# Patient Record
Sex: FEMALE | Race: WHITE | NOT HISPANIC OR LATINO | Employment: FULL TIME | URBAN - METROPOLITAN AREA
[De-identification: names, ages, dates, MRNs, and addresses within clinical notes are randomized per-mention and may not be internally consistent; named-entity substitution may affect disease eponyms.]

---

## 2020-02-08 ENCOUNTER — HOSPITAL ENCOUNTER (EMERGENCY)
Facility: CLINIC | Age: 35
Discharge: HOME | End: 2020-02-09
Attending: EMERGENCY MEDICINE | Admitting: EMERGENCY MEDICINE
Payer: COMMERCIAL

## 2020-02-08 DIAGNOSIS — J11.1 INFLUENZA: ICD-10-CM

## 2020-02-08 PROCEDURE — 25000132 ZZH RX MED GY IP 250 OP 250 PS 637: Performed by: EMERGENCY MEDICINE

## 2020-02-08 PROCEDURE — 99283 EMERGENCY DEPT VISIT LOW MDM: CPT

## 2020-02-08 PROCEDURE — 25000128 H RX IP 250 OP 636: Performed by: EMERGENCY MEDICINE

## 2020-02-08 RX ORDER — IBUPROFEN 600 MG/1
600 TABLET, FILM COATED ORAL ONCE
Status: COMPLETED | OUTPATIENT
Start: 2020-02-08 | End: 2020-02-08

## 2020-02-08 RX ORDER — ONDANSETRON 4 MG/1
4 TABLET, ORALLY DISINTEGRATING ORAL ONCE
Status: COMPLETED | OUTPATIENT
Start: 2020-02-08 | End: 2020-02-08

## 2020-02-08 RX ORDER — ACETAMINOPHEN 500 MG
500 TABLET ORAL ONCE
Status: COMPLETED | OUTPATIENT
Start: 2020-02-08 | End: 2020-02-08

## 2020-02-08 RX ADMIN — ACETAMINOPHEN 500 MG: 500 TABLET, FILM COATED ORAL at 23:55

## 2020-02-08 RX ADMIN — IBUPROFEN 600 MG: 600 TABLET, FILM COATED ORAL at 23:55

## 2020-02-08 RX ADMIN — ONDANSETRON 4 MG: 4 TABLET, ORALLY DISINTEGRATING ORAL at 23:55

## 2020-02-08 ASSESSMENT — ENCOUNTER SYMPTOMS
CHILLS: 1
HEADACHES: 1
MYALGIAS: 1
DYSURIA: 0
SHORTNESS OF BREATH: 1
DIARRHEA: 0
FEVER: 1
VOMITING: 1
COUGH: 1
NAUSEA: 1

## 2020-02-08 NOTE — ED AVS SNAPSHOT
[facility] Emergency Department  [address]  [facility] [address]  Phone:  [phone]  Fax:  [phone]                                    Rica Holguin   MRN: [id number]    Department:  [facility] Emergency Department   Date of Visit:  2/8/2020           After Visit Summary Signature Page    I have received my discharge instructions, and my questions have been answered. I have discussed any challenges I see with this plan with the nurse or doctor.    ..........................................................................................................................................  Patient/Patient Representative Signature      ..........................................................................................................................................  Patient Representative Print Name and Relationship to Patient    ..................................................               ................................................  Date                                   Time    ..........................................................................................................................................  Reviewed by Signature/Title    ...................................................              ..............................................  Date                                               Time          22EPIC Rev 08/18

## 2020-02-08 NOTE — LETTER
February 9, 2020      To Whom It May Concern:      Rica Holguin was seen in our Emergency Department today, 02/09/20.  Please excuse her from work through Tuesday 2/11/20.    Sincerely,        Maninder Adams MD

## 2020-02-09 VITALS
WEIGHT: 145 LBS | TEMPERATURE: 99.4 F | SYSTOLIC BLOOD PRESSURE: 118 MMHG | OXYGEN SATURATION: 95 % | DIASTOLIC BLOOD PRESSURE: 71 MMHG | RESPIRATION RATE: 20 BRPM

## 2020-02-09 RX ORDER — ONDANSETRON 4 MG/1
4 TABLET, ORALLY DISINTEGRATING ORAL EVERY 8 HOURS PRN
Qty: 10 TABLET | Refills: 0 | Status: SHIPPED | OUTPATIENT
Start: 2020-02-09 | End: 2020-02-12

## 2020-02-09 NOTE — ED PROVIDER NOTES
History     Chief Complaint:    Flu Symptoms    The history is provided by the patient.      Rica Holguin is a 34 year old female who presents with flu symptoms. Her symptoms began yesterday at work with chills. The patient states that she has been experiencing fevers, a dry cough, myalgias, nausea, vomiting, shortness of breath, and a headache since then. She denies diarrhea or dysuria. The patient has been taking cold and flu medicine containing acetaminophen. She had a fever of 102.3 around 2230 this evening and took 500 mg of Tylenol. The patient states that she has coworkers who are also sick. She has been able to keep water down.     Allergies:  No Known Drug Allergies     Medications:    The patient is currently on no regular medications.    Past Medical History:    The patient denies any significant past medical history.    Past Surgical History:    The patient does not have any pertinent past surgical history.    Family History:    No past pertinent family history.    Social History:  Tobacco use - unknown.  Alcohol use - unknown.  Drug use - unknown.  Patient accompanied to the ED by a family member.  Marital Status:  Unknown.     Review of Systems   Constitutional: Positive for chills and fever.   Respiratory: Positive for cough and shortness of breath.    Gastrointestinal: Positive for nausea and vomiting. Negative for diarrhea.   Genitourinary: Negative for dysuria.   Musculoskeletal: Positive for myalgias.   Neurological: Positive for headaches.   All other systems reviewed and are negative.        Physical Exam     Patient Vitals for the past 24 hrs:   BP Temp Temp src Heart Rate Resp SpO2 Weight   02/09/20 0047 -- 99.4  F (37.4  C) Oral -- -- -- --   02/09/20 0045 -- -- -- -- -- 95 % --   02/08/20 2327 118/71 99  F (37.2  C) Oral 133 20 99 % 65.8 kg (145 lb)     Physical Exam  Constitutional: Alert, attentive  HENT:     Nose: Nose normal.   Mouth/Throat: Oropharynx is clear, mucous membranes are  moist   Ears: Normal external ears. TMs clear bilaterally, normal external canals bilaterally.  Eyes: EOM are normal.    CV: tachcyardic, regular rhythm, no murmurs, rubs or gallups.  Chest: Effort normal and breath sounds normal.   GI: No distension. There is no tenderness.  MSK: Normal range of motion.   Neurological: Alert, attentive  Skin: Skin is warm and dry.      Emergency Department Course     Interventions:  2355: ibuprofen 600 mg PO  2355: Tylenol 500 mg PO  2355: Zofran-ODT 4 mg PO    Emergency Department Course:  Past medical records, nursing notes, and vitals reviewed.    2336: I performed an exam of the patient as documented above.     0057: I rechecked the patient and discussed the results of her workup thus far.     I personally reviewed the results with the Patient and family member and answered all related questions prior to discharge.     Findings and plan explained to the Patient and family member. Patient discharged home with instructions regarding supportive care, medications, and reasons to return. The importance of close follow-up was reviewed.     Vitals:    02/08/20 2327 02/09/20 0045 02/09/20 0047   BP: 118/71     Resp: 20     Temp: 99  F (37.2  C)  99.4  F (37.4  C)   TempSrc: Oral  Oral   SpO2: 99% 95%    Weight: 65.8 kg (145 lb)        on recheck      Impression & Plan     Medical Decision Making:  This is a well appearing 34 year old female who presents with history and exam consistent with acute febrile illness, with multiple exposures at work. There is no evidence of acute otitis media. There is no significant tachypnea, increased work of breathing, focal exam findings, or persistent symptoms to suggest pneumonia; I do not believe imaging is indicated at this time. The patient is febrile in the department, improved with supportive cares. Educated to antipyretic use. The patient is well-hydrated, well-appearing, and I believe is safe for discharge with plan for supportive cares.  I discussed precautions and how influenza is spread. The patient may take Tylenol or ibuprofen for pain and fever. Return if increasing pain, fever, difficulty breathing, vomiting, or any other concerns. Follow-up with primary physician in 3-5 days.      Diagnosis:    ICD-10-CM   1. Influenza J11.1     Disposition:  Discharged to home.    Discharge Medications:  New Prescriptions    ONDANSETRON (ZOFRAN ODT) 4 MG ODT TAB    Take 1 tablet (4 mg) by mouth every 8 hours as needed for nausea     Scribe Disclosure:  I, Terra Martinez, am serving as a scribe at 11:31 PM on 2/8/2020 to document services personally performed by Maninder Adams MD based on my observations and the provider's statements to me.     Terra Martinez  2/8/2020   [facility] EMERGENCY DEPARTMENT       Maninder Adams MD  02/09/20 0219

## 2020-02-09 NOTE — ED TRIAGE NOTES
Pt states fever, chills, nausea, vomiting, dyspnea, headache for one day. Last  Med 500mg tylenol at 2230. ABCs intact GCS 15

## 2020-03-11 ENCOUNTER — HEALTH MAINTENANCE LETTER (OUTPATIENT)
Age: 35
End: 2020-03-11

## 2021-01-04 ENCOUNTER — HEALTH MAINTENANCE LETTER (OUTPATIENT)
Age: 36
End: 2021-01-04

## 2021-02-25 LAB
HPV ABSTRACT: NORMAL
PAP SMEAR - HIM PATIENT REPORTED: NEGATIVE

## 2021-03-15 ENCOUNTER — OFFICE VISIT (OUTPATIENT)
Dept: FAMILY MEDICINE | Facility: CLINIC | Age: 36
End: 2021-03-15
Payer: COMMERCIAL

## 2021-03-15 VITALS
SYSTOLIC BLOOD PRESSURE: 110 MMHG | HEIGHT: 66 IN | TEMPERATURE: 96.8 F | WEIGHT: 115 LBS | DIASTOLIC BLOOD PRESSURE: 86 MMHG | HEART RATE: 99 BPM | BODY MASS INDEX: 18.48 KG/M2 | RESPIRATION RATE: 16 BRPM | OXYGEN SATURATION: 99 %

## 2021-03-15 DIAGNOSIS — Z31.69 ENCOUNTER FOR PRECONCEPTION CONSULTATION: ICD-10-CM

## 2021-03-15 DIAGNOSIS — R22.1 LUMP IN NECK: Primary | ICD-10-CM

## 2021-03-15 DIAGNOSIS — E04.9 ENLARGED THYROID: ICD-10-CM

## 2021-03-15 LAB
BASOPHILS NFR BLD AUTO: 0.5 %
DIFFERENTIAL METHOD BLD: ABNORMAL
EOSINOPHIL NFR BLD AUTO: 1.4 %
ERYTHROCYTE [DISTWIDTH] IN BLOOD BY AUTOMATED COUNT: 12.4 % (ref 10–15)
HCT VFR BLD AUTO: 43.7 % (ref 35–47)
HGB BLD-MCNC: 14.3 G/DL (ref 11.7–15.7)
LYMPHOCYTES NFR BLD AUTO: 28.3 %
MCH RBC QN AUTO: 29.4 PG (ref 26.5–33)
MCHC RBC AUTO-ENTMCNC: 32.7 G/DL (ref 31.5–36.5)
MCV RBC AUTO: 90 FL (ref 78–100)
MONOCYTES NFR BLD AUTO: 7.3 %
NEUTROPHILS NFR BLD AUTO: 62.5 %
PLATELET # BLD AUTO: 454 10E9/L (ref 150–450)
RBC # BLD AUTO: 4.87 10E12/L (ref 3.8–5.2)
T3 SERPL-MCNC: 99 NG/DL (ref 60–181)
WBC # BLD AUTO: 7.3 10E9/L (ref 4–11)

## 2021-03-15 PROCEDURE — 85025 COMPLETE CBC W/AUTO DIFF WBC: CPT | Performed by: FAMILY MEDICINE

## 2021-03-15 PROCEDURE — 84480 ASSAY TRIIODOTHYRONINE (T3): CPT | Performed by: FAMILY MEDICINE

## 2021-03-15 PROCEDURE — 84443 ASSAY THYROID STIM HORMONE: CPT | Performed by: FAMILY MEDICINE

## 2021-03-15 PROCEDURE — 99203 OFFICE O/P NEW LOW 30 MIN: CPT | Performed by: FAMILY MEDICINE

## 2021-03-15 PROCEDURE — 36415 COLL VENOUS BLD VENIPUNCTURE: CPT | Performed by: FAMILY MEDICINE

## 2021-03-15 PROCEDURE — 86800 THYROGLOBULIN ANTIBODY: CPT | Performed by: FAMILY MEDICINE

## 2021-03-15 PROCEDURE — 86376 MICROSOMAL ANTIBODY EACH: CPT | Performed by: FAMILY MEDICINE

## 2021-03-15 PROCEDURE — 84439 ASSAY OF FREE THYROXINE: CPT | Performed by: FAMILY MEDICINE

## 2021-03-15 RX ORDER — VITAMIN A, VITAMIN C, VITAMIN D-3, VITAMIN E, VITAMIN B-1, VITAMIN B-2, NIACIN, VITAMIN B-6, CALCIUM, IRON, ZINC, COPPER 4000; 120; 400; 22; 1.84; 3; 20; 10; 1; 12; 200; 27; 25; 2 [IU]/1; MG/1; [IU]/1; MG/1; MG/1; MG/1; MG/1; MG/1; MG/1; UG/1; MG/1; MG/1; MG/1; MG/1
1 TABLET ORAL DAILY
COMMUNITY
Start: 2021-03-15

## 2021-03-15 SDOH — HEALTH STABILITY: MENTAL HEALTH: HOW OFTEN DO YOU HAVE A DRINK CONTAINING ALCOHOL?: NEVER

## 2021-03-15 SDOH — ECONOMIC STABILITY: FOOD INSECURITY: WITHIN THE PAST 12 MONTHS, THE FOOD YOU BOUGHT JUST DIDN'T LAST AND YOU DIDN'T HAVE MONEY TO GET MORE.: NOT ASKED

## 2021-03-15 SDOH — ECONOMIC STABILITY: TRANSPORTATION INSECURITY
IN THE PAST 12 MONTHS, HAS THE LACK OF TRANSPORTATION KEPT YOU FROM MEDICAL APPOINTMENTS OR FROM GETTING MEDICATIONS?: NOT ASKED

## 2021-03-15 SDOH — ECONOMIC STABILITY: FOOD INSECURITY: WITHIN THE PAST 12 MONTHS, YOU WORRIED THAT YOUR FOOD WOULD RUN OUT BEFORE YOU GOT MONEY TO BUY MORE.: NOT ASKED

## 2021-03-15 SDOH — ECONOMIC STABILITY: INCOME INSECURITY: HOW HARD IS IT FOR YOU TO PAY FOR THE VERY BASICS LIKE FOOD, HOUSING, MEDICAL CARE, AND HEATING?: NOT ASKED

## 2021-03-15 SDOH — ECONOMIC STABILITY: TRANSPORTATION INSECURITY
IN THE PAST 12 MONTHS, HAS LACK OF TRANSPORTATION KEPT YOU FROM MEETINGS, WORK, OR FROM GETTING THINGS NEEDED FOR DAILY LIVING?: NOT ASKED

## 2021-03-15 ASSESSMENT — MIFFLIN-ST. JEOR: SCORE: 1233.39

## 2021-03-15 NOTE — PATIENT INSTRUCTIONS
[facility]  [address] [place] [id number]  Office: [phone]   Fax:    [phone]         Patient Education     Common Thyroid Problems  The thyroid is a gland in the neck. It's controlled by the pituitary gland in the brain. The thyroid makes 2 hormones that control how the body uses and stores energy. If there's a problem with the thyroid, the level of thyroid hormones may change. This can lead to symptoms. Thyroid problems can be treated.    Hypothyroidism  This is when the thyroid gland doesn t make enough hormone. The most common cause of hypothyroidism is Hashimoto thyroiditis. This occurs when the body s immune system mistakenly attacks the thyroid gland. This damages the thyroid so it doesn't make enough hormones. Hypothyroidism may also occur if there s a severe deficiency or an excess of iodine in the body. The thyroid needs iodine to make hormone. Problems with the pituitary gland can lead to not enough production of thyroid hormone. Some medicines can cause hypothyroidism. Hypothyroidism will also occur if the thyroid gland is removed during surgery. Hypothyroidism, can also develop after pregnancy.  Common symptoms include:    Low energy and tiredness    Depression    Feeling cold    Muscle pain    Slowed thinking        Constipation    Heavier menstrual periods with prolonged bleeding     Weight gain    Dry and brittle skin, hair, nails    Excessive sleepiness  Hyperthyroidism  This is when the thyroid gland makes too much hormone. The most common cause is Graves disease. It occurs when the body s immune system mistakenly tells the thyroid to make too much hormone. Another cause is a small bump (nodule) in the thyroid gland. This can cause hyperthyroidism if the cells in the nodule make too much thyroid hormone and stop hormone production in the rest of the thyroid gland.  Common symptoms include:    Shaking, nervousness, irritability    Feeling hot    A rapid  or irregular heartbeat    Muscle weakness, fatigue    Tremors in the hands    More frequent bowel movements    Pixley, lighter. or irregular menstrual periods    Weight loss    Hair loss    Bulging eyes  Nodules  Nodules are lumps of tissue in the thyroid gland. Most often, the cause of nodules isn t known. But they may be more common in people who ve had treatment with radiation to the head or neck. Sometimes nodules can be felt on the outside of the neck. Most of the time, the nodules cause no symptoms and don t affect the amount of thyroid hormone. Most nodules are not cancer. But sometimes a nodule may be cancer. Risk factors for cancer include age, gender, family history of thyroid cancer, and history of radiation exposure.  What is a goiter?  A goiter is the enlargement of the thyroid gland. When the gland enlarges, you may see or feel a swelling on your neck. A goiter can develop in someone with a normal thyroid, overactive thyroid, or underactive thyroid.  [employer] last reviewed this educational content on 3/1/2020    3528-0469 [employer]. All rights reserved. This information is not intended as a substitute for professional medical care. Always follow your healthcare professional's instructions.           Patient Education     Treating Thyroid Problems  Your healthcare provider has diagnosed a thyroid problem and will work with you to create a treatment plan. Even if you don t have symptoms, getting the correct care is important. These are the most common types of thyroid disorders and their treatments.     Treating hypothyroidism  Hypothyroidism is an underactive thyroid. There is no cure for this condition. But treatment can relieve most or all of your symptoms caused by the low thyroid hormone levels. Treatment is done with daily thyroid hormone pills. Thyroid hormone pills replace the hormone your thyroid doesn t make. You will likely need to take a daily pill for the rest of your life.    Your healthcare provider will adjust your dose to achieve the right hormone levels. Take the thyroid hormone pill on an empty stomach, without other medicines. This is to make sure it works as it should.   Over time, your dose may be adjusted. The medicine has minimal side effects if the dosage is correct. But if the dosage is too high, you may have symptoms of an overactive thyroid. These include:     Nervousness    Irritability    Fast heartbeat    Shaking (tremors)    Trouble sleeping    Brittle hair.  If the dosage is too low, you may have symptoms of an underactive thyroid. These include:     Low energy    Sleepiness    Memory problems  Tell your healthcare provider if you have any symptoms of thyroid problems. Also let your provider know if you are prescribed any new medicines, especially estrogens or testosterone. These will change the dose of thyroid hormone that is right for you. Your dose will also need to increase if you become pregnant.   Treating hyperthyroidism  Hyperthyroidism is an overactive thyroid. The treatments include:     Anti-thyroid medicine. This can reduce the amount of thyroid hormone made by your thyroid gland. Reactions from this medicine are rare. But in some cases it can cause a dangerous drop in white blood cells, and liver damage. Talk with your healthcare provider for more information. If you have a fever or sore throat while taking this medicine, tell your provider right away.    Radioactive iodine ablation. This is the most common treatment for hyperthyroidism. It s done by taking a pill or liquid dose of radioactive iodine. This treatment destroys the thyroid cells that are making too much hormone. You may need daily thyroid hormone pills after this treatment.    Surgery. This is done by removing part or all of your thyroid gland. After surgery, you may need to take daily thyroid hormone pills.    Beta-blockers. The treatments above may be used alone or with beta-blockers.  These are medicines that can reduce symptoms caused by too much thyroid hormone. In some case, symptoms from too much thyroid hormone can be controlled by beta-blockers alone.  Treating nodules  If you have noncancer (benign) nodules, you may not need treatment right away. Instead, your healthcare provider may advise regular exams and ultrasound tests to see if the nodules grow. If treatment is needed, it may include:     Anti-thyroid medicine. If a benign thyroid nodule is causing an overactive thyroid, your healthcare provider may first treat it with anti-thyroid medicine. If this doesn t work, you may need one of the below treatments.    Radioactive iodine ablation. This is the most common treatment for hyperthyroidism. It s done by taking a pill or liquid dose of radioactive iodine. This treatment destroys the thyroid cells that are making too much hormone. You may need daily thyroid hormone pills after this treatment.    Surgery. This may be done to treat nodules that are causing symptoms, such as choking or trouble swallowing. Surgery is done by removing part or all of your thyroid gland. Surgery to remove cancer nodules may be followed by radioiodine iodine ablation. After surgery, you may need to take daily thyroid hormone pills.  If you have radioactive iodine ablation  Even though it uses tiny amounts of radiation, radioactive iodine ablation is a safe treatment. Your healthcare provider will talk with you about any risks and possible complications. You will likely receive the iodine at the hospital and go home the same day. The risk from the radiation to yourself and others is very small. But you may need to stay away from other people for a few days. It's most important to stay away from children and pregnant women during this time.   [employer] last reviewed this educational content on 8/1/2019 2000-2020 [employer]. All rights reserved. This information is not intended as a substitute  for professional medical care. Always follow your healthcare professional's instructions.           Patient Education     Local Lymph Node Swelling in the Neck, No Antibiotic Treatment    You have a swollen lymph node in your neck that is not infected. The lymph nodes are part of the immune system. They are found under the jaw and along the side of the neck, in the armpits, and in the groin. A nearby infection or inflammation causes the lymph nodes in that area to swell. They may also be mildly tender. This is normal.   Antibiotics are not used for a swollen lymph node that is not infected. You can use warm compresses and pain medicine to treat this condition. The pain will get better over the next 7 to 10 days. The swelling may take 1 to 2 weeks or more to go away.   Rarely, a bacterial infection occurs inside the lymph node itself. When this happens, the lymph node becomes very painful and the nearby skin gets red and warm. You may also have a fever. If this happens, call your healthcare provider. You may need to take antibiotics. You may also need to have the lymph node drained.   Home care  Follow these guidelines when caring for yourself at home:    Make a warm compress by running warm water over a washcloth. Put the compress on the sore area until the compress cools off. Repeat this for 20 minutes. Use the compress 3 times a day for the first 3 days, or until the pain and redness start to get better. The heat will make more blood flow to the area and speed the healing process.    You may use acetaminophen or ibuprofen to control pain and fever, unless another medicine was prescribed for this. Don t use ibuprofen in children under 6 months of age. If you have chronic liver or kidney disease, talk with your healthcare provider before using these medicines. Also talk with your provider if you ve had a stomach ulcer or digestive tract bleeding. Don t give aspirin to anyone under 18 years of age who is ill with a  fever. It may cause severe liver damage.  Follow-up care  Follow up with your healthcare provider, or as advised.  When to seek medical advice  Call your healthcare provider right away if any of these occur:    Redness over the lymph node    Swelling or pain in the lymph node gets worse    Lymph node is getting soft in the middle    Pus or fluid drains from the lymph node    You have trouble breathing or swallowing    Fever of 100.4 F (38 C) or higher, or as directed by your healthcare provider    You have questions or concerns   [employer] last reviewed this educational content on 9/1/2019 2000-2020 [employer]. All rights reserved. This information is not intended as a substitute for professional medical care. Always follow your healthcare professional's instructions.         Thank you so much or choosing [facility]  for your Health Care. It was a pleasure seeing you at your visit today! Please contact us with any questions or concerns you may have.                   Luz Elise MD                              To reach your [facility] care team after hours call:   [phone]    PLEASE NOTE OUR HOURS HAVE CHANGED secondary to COVID-19 coronavirus pandemic, as we are trying to minimize patient exposure to the virus,  which is now widespread in the [facility].  These hours may change with very little notice.  We apologize for any inconvenience.       Our current clinic hours are:          Monday- Thursday   7:00am - 6:00pm  in person.      Friday  7:00am- 5:00pm                       Saturday and Sunday : Closed to in person and virtual visits        We have telephone and virtual visit times available between    7:00am - 6pm on Monday-Friday as well.                                                Phone:  [phone]      Our pharmacy hours: Monday through Friday 9:00am to 5:00pm                        Saturday - 9:00 am to 12 noon        Sunday : Closed.              Phone:  [phone]              ###  Please note: at this time we are not accepting any walk-in visits. ###      There is also information available at our web site:  [email or web address]    If your provider ordered any lab tests and you do not receive the results within 10 business days, please call the clinic.    If you need a medication refill please contact your pharmacy.  Please allow 2 business days for your refill to be completed.    Our clinic offers telephone visits and e visits.  Please ask one of your team members to explain more.      Use [employer] (secure email communication and access to your chart) to send your primary care provider a message or make an appointment. Ask someone on your Team how to sign up for "[employer]t.

## 2021-03-15 NOTE — PROGRESS NOTES
Assessment & Plan :     (R22.1) Lump in neck- left lateral just posterior to the Sternocleidomastoid muscle on the left -1cm x 5mm on palpation - soft , mobile   (primary encounter diagnosis) - feels very benign today.   Plan: CBC with platelets differential, US Head Neck         Soft Tissue            (E04.9) Enlarged thyroid  Comment: mild generalized enlargement without overt nodularity.   Plan: CBC with platelets differential, US Head Neck         Soft Tissue, T4 free, TSH, T3 total, Anti         thyroglobulin antibody, Thyroid peroxidase         antibody            Please, call our clinic or go to the ER immediately if signs or symptoms worsen or fail to improve as anticipated.       Review of prior external note(s) from - CareEverywhere information from [place] [facility] - Dr. Myrna Vazquez  reviewed    37 minutes spent on the date of the encounter doing chart review, history and exam, documentation and further activities as noted above       Regular exercise  See Patient Instructions    Return in about 1 year (around 3/15/2022).or sooner, if any signs or symptoms worsen.             Luz Elise MD  [facility] [place]    Jaime Strauss is a 35 year old who presents for the following health issues - new patient to me today.     Medical history and chart updated in detail today.     HPI       Concern - lump in neck:   Onset: x3 months- noticed first during a massage - ob/gyn could not feel it on exam on 2/25/2021.   Description: lump on left melba eof neck  Intensity: not painful  Progression of Symptoms:  same  Accompanying Signs & Symptoms: no other symptoms - no fatigue, No fevers, chills or sweats. , no ear pain or pressure, no bug bites in area. No sore throat , no URI symptoms.   Previous history of similar problem: no      Health Maintenance   Topic Date Due     PREVENTIVE CARE VISIT  Never done     ANNUAL REVIEW OF HM ORDERS  Never done     ADVANCE CARE PLANNING   Never done     HIV SCREENING  Never done     HEPATITIS C SCREENING  Never done     PAP  Never done     DTAP/TDAP/TD IMMUNIZATION (1 - Tdap) Never done     INFLUENZA VACCINE (1) 09/01/2020     PHQ-2  Completed     IPV IMMUNIZATION  Completed     Pneumococcal Vaccine: Pediatrics (0 to 5 Years) and At-Risk Patients (6 to 64 Years)  Aged Out     MENINGITIS IMMUNIZATION  Aged Out     HEPATITIS B IMMUNIZATION  Aged Out       Patient Active Problem List   Diagnosis     Encounter for gynecological examination without abnormal finding - sees Myrna Vazquez, at [facility] in [place], [place]     Herniation of lumbar intervertebral disc     Hyperlipidemia LDL goal <160     Tension-type headache, not intractable, unspecified chronicity pattern     Low grade squamous intraepithelial lesion on cytologic smear of cervix (LGSIL)       Past Medical History:   Diagnosis Date     Encounter for gynecological examination without abnormal finding     sees Myrna Vazquez, at [facility] in [place], [place]     Herniation of lumbar intervertebral disc 2016    chronic LBP - herniation L4-L5 - no radicular symptoms- seeing Dr. Hill- [facility]     Hyperlipidemia LDL goal <160     doesn't run in family     Low grade squamous intraepithelial lesion on cytologic smear of cervix (LGSIL)     s/p LEEP in 2014 - at [facility] in [place] - normal paps since then     Tension-type headache, not intractable, unspecified chronicity pattern     feels knots in back of neck       Past Surgical History:   Procedure Laterality Date     LEEP TX, CERVICAL  2014    at [facility] in [place] - normal paps since then     XR FOOT SURGERY QUIRINO LEFT      reattached dorsal tendon after fall into glass table while hanging a picture       Social History     Socioeconomic History     Marital status:      Spouse name: Not on file     Number of children: Not on file     Years of education: Not on file     Highest education level: Not on file   Occupational History      Occupation: Drug and Mental Health Counselor     Employer: [employer]   Social Needs     Financial resource strain: Not on file     Food insecurity     Worry: Not on file     Inability: Not on file     Transportation needs     Medical: Not on file     Non-medical: Not on file   Tobacco Use     Smoking status: Former Smoker     Packs/day: 0.50     Years: 9.00     Pack years: 4.50     Types: Cigarettes     Quit date:      Years since quittin.2     Smokeless tobacco: Never Used   Substance and Sexual Activity     Alcohol use: Never     Frequency: Never     Drug use: Never     Sexual activity: Yes     Partners: Male     Comment: mongamous with boyfriend - trying to get pregnant- taking prenatal vits   Lifestyle     Physical activity     Days per week: Not on file     Minutes per session: Not on file     Stress: Not on file   Relationships     Social connections     Talks on phone: Not on file     Gets together: Not on file     Attends Yarsani service: Not on file     Active member of club or organization: Not on file     Attends meetings of clubs or organizations: Not on file     Relationship status: Not on file     Intimate partner violence     Fear of current or ex partner: Not on file     Emotionally abused: Not on file     Physically abused: Not on file     Forced sexual activity: Not on file   Other Topics Concern     Not on file   Social History Narrative    Immigrant from [facility] [place] in .  ---Luz Elise MD             Family History   Problem Relation Age of Onset     No Known Problems Mother      No Known Problems Father      Current Outpatient Medications   Medication Sig Dispense Refill     Prenatal Vit-Fe Fumarate-FA (PRENATAL VITAMIN PLUS LOW IRON) 27-1 MG TABS Take 1 tablet by mouth daily        No Known Allergies     Review of Systems   Constitutional, HEENT, cardiovascular, pulmonary, gi and gu systems are negative, except as otherwise noted.     "  Objective    /86   Pulse 99   Temp 96.8  F (36  C)   Resp 16   Ht 1.676 m (5' 6\")   Wt 52.2 kg (115 lb)   LMP 02/19/2021   SpO2 99%   BMI 18.56 kg/m    Body mass index is 18.56 kg/m .  Physical Exam   GENERAL: healthy, alert and no distress  EYES: Eyes grossly normal to inspection, PERRL and conjunctivae and sclerae normal  HENT: ear canals and TM's normal, nose and mouth without ulcers or lesions  NECK: cervical adenopathy left lateral just posterior to the Sternocleidomastoid muscle on the left -1cm x 5mm on palpation  singular, soft , mobile   (primary encounter diagnosis) - feels very benign today.    no asymmetry, other masses, or scars, thyromegaly approximately 1.5 times normal and trachea midline and normal to palpation  RESP: lungs clear to auscultation - no rales, rhonchi or wheezes  CV: regular rate and rhythm, normal S1 S2, no S3 or S4, no murmur, click or rub, no peripheral edema and peripheral pulses strong  ABDOMEN: soft, nontender, no hepatosplenomegaly, no masses and bowel sounds normal  MS: no gross musculoskeletal defects noted, no edema  SKIN: no suspicious lesions or rashes  NEURO: Normal strength and tone, mentation intact and speech normal  PSYCH: mentation appears normal, affect normal/bright    No results found for any visits on 03/15/21.         Luz Elise MD          "

## 2021-03-16 LAB
T4 FREE SERPL-MCNC: 1.03 NG/DL (ref 0.76–1.46)
THYROGLOB AB SERPL IA-ACNC: <20 IU/ML (ref 0–40)
THYROPEROXIDASE AB SERPL-ACNC: 18 IU/ML
TSH SERPL DL<=0.005 MIU/L-ACNC: 1.8 MU/L (ref 0.4–4)

## 2021-03-22 ENCOUNTER — HOSPITAL ENCOUNTER (OUTPATIENT)
Dept: ULTRASOUND IMAGING | Facility: CLINIC | Age: 36
Discharge: HOME | End: 2021-03-22
Attending: FAMILY MEDICINE | Admitting: FAMILY MEDICINE
Payer: COMMERCIAL

## 2021-03-22 DIAGNOSIS — R22.1 LUMP IN NECK: ICD-10-CM

## 2021-03-22 DIAGNOSIS — E04.9 ENLARGED THYROID: ICD-10-CM

## 2021-03-22 PROCEDURE — 76536 US EXAM OF HEAD AND NECK: CPT

## 2021-04-25 ENCOUNTER — HEALTH MAINTENANCE LETTER (OUTPATIENT)
Age: 36
End: 2021-04-25

## 2021-10-10 ENCOUNTER — HEALTH MAINTENANCE LETTER (OUTPATIENT)
Age: 36
End: 2021-10-10

## 2022-04-13 ENCOUNTER — TRANSFERRED RECORDS (OUTPATIENT)
Dept: HEALTH INFORMATION MANAGEMENT | Facility: CLINIC | Age: 37
End: 2022-04-13
Payer: COMMERCIAL

## 2022-05-18 ENCOUNTER — TRANSFERRED RECORDS (OUTPATIENT)
Dept: HEALTH INFORMATION MANAGEMENT | Facility: CLINIC | Age: 37
End: 2022-05-18

## 2022-05-18 LAB
CHOLESTEROL (EXTERNAL): 219 MG/DL (ref 100–199)
HDLC SERPL-MCNC: 80 MG/DL
LDL CHOLESTEROL CALCULATED (EXTERNAL): 146 MG/DL (ref 0–99)
PHQ9 SCORE: 0
TRIGLYCERIDES (EXTERNAL): 75 MG/DL (ref 0–149)

## 2022-05-22 ENCOUNTER — HEALTH MAINTENANCE LETTER (OUTPATIENT)
Age: 37
End: 2022-05-22

## 2022-08-24 ENCOUNTER — TRANSFERRED RECORDS (OUTPATIENT)
Dept: HEALTH INFORMATION MANAGEMENT | Facility: CLINIC | Age: 37
End: 2022-08-24

## 2022-08-29 ENCOUNTER — TRANSFERRED RECORDS (OUTPATIENT)
Dept: HEALTH INFORMATION MANAGEMENT | Facility: CLINIC | Age: 37
End: 2022-08-29

## 2022-09-18 ENCOUNTER — HEALTH MAINTENANCE LETTER (OUTPATIENT)
Age: 37
End: 2022-09-18

## 2023-06-04 ENCOUNTER — HEALTH MAINTENANCE LETTER (OUTPATIENT)
Age: 38
End: 2023-06-04

## 2024-02-27 SDOH — HEALTH STABILITY: PHYSICAL HEALTH: ON AVERAGE, HOW MANY MINUTES DO YOU ENGAGE IN EXERCISE AT THIS LEVEL?: 30 MIN

## 2024-02-27 SDOH — HEALTH STABILITY: PHYSICAL HEALTH: ON AVERAGE, HOW MANY DAYS PER WEEK DO YOU ENGAGE IN MODERATE TO STRENUOUS EXERCISE (LIKE A BRISK WALK)?: 1 DAY

## 2024-02-27 ASSESSMENT — SOCIAL DETERMINANTS OF HEALTH (SDOH): HOW OFTEN DO YOU GET TOGETHER WITH FRIENDS OR RELATIVES?: ONCE A WEEK

## 2024-03-01 ENCOUNTER — OFFICE VISIT (OUTPATIENT)
Dept: FAMILY MEDICINE | Facility: CLINIC | Age: 39
End: 2024-03-01
Payer: COMMERCIAL

## 2024-03-01 VITALS
SYSTOLIC BLOOD PRESSURE: 122 MMHG | HEIGHT: 66 IN | OXYGEN SATURATION: 98 % | DIASTOLIC BLOOD PRESSURE: 78 MMHG | RESPIRATION RATE: 16 BRPM | WEIGHT: 135 LBS | HEART RATE: 99 BPM | TEMPERATURE: 97.6 F | BODY MASS INDEX: 21.69 KG/M2

## 2024-03-01 DIAGNOSIS — R22.1 LUMP IN NECK: ICD-10-CM

## 2024-03-01 DIAGNOSIS — Z11.4 SCREENING FOR HIV (HUMAN IMMUNODEFICIENCY VIRUS): ICD-10-CM

## 2024-03-01 DIAGNOSIS — Z00.01 ENCOUNTER FOR ROUTINE ADULT MEDICAL EXAM WITH ABNORMAL FINDINGS: Primary | ICD-10-CM

## 2024-03-01 DIAGNOSIS — M51.26 HERNIATION OF LUMBAR INTERVERTEBRAL DISC: ICD-10-CM

## 2024-03-01 DIAGNOSIS — Z78.9 HEPATITIS B VIRUS SEROLOGIC STATUS UNKNOWN: ICD-10-CM

## 2024-03-01 DIAGNOSIS — Z23 NEED FOR TDAP VACCINATION: ICD-10-CM

## 2024-03-01 DIAGNOSIS — Z12.4 CERVICAL CANCER SCREENING: ICD-10-CM

## 2024-03-01 DIAGNOSIS — Z01.419 ENCOUNTER FOR GYNECOLOGICAL EXAMINATION WITHOUT ABNORMAL FINDING: ICD-10-CM

## 2024-03-01 DIAGNOSIS — R87.612 LOW GRADE SQUAMOUS INTRAEPITHELIAL LESION ON CYTOLOGIC SMEAR OF CERVIX (LGSIL): ICD-10-CM

## 2024-03-01 DIAGNOSIS — E78.5 HYPERLIPIDEMIA LDL GOAL <160: ICD-10-CM

## 2024-03-01 DIAGNOSIS — Z11.59 NEED FOR HEPATITIS C SCREENING TEST: ICD-10-CM

## 2024-03-01 DIAGNOSIS — Z23 NEED FOR HEPATITIS B VACCINATION: ICD-10-CM

## 2024-03-01 PROCEDURE — 99395 PREV VISIT EST AGE 18-39: CPT | Mod: 25 | Performed by: FAMILY MEDICINE

## 2024-03-01 PROCEDURE — 90471 IMMUNIZATION ADMIN: CPT | Performed by: FAMILY MEDICINE

## 2024-03-01 PROCEDURE — 86706 HEP B SURFACE ANTIBODY: CPT | Performed by: FAMILY MEDICINE

## 2024-03-01 PROCEDURE — 36415 COLL VENOUS BLD VENIPUNCTURE: CPT | Performed by: FAMILY MEDICINE

## 2024-03-01 PROCEDURE — 80061 LIPID PANEL: CPT | Performed by: FAMILY MEDICINE

## 2024-03-01 PROCEDURE — 99213 OFFICE O/P EST LOW 20 MIN: CPT | Mod: 25 | Performed by: FAMILY MEDICINE

## 2024-03-01 PROCEDURE — 90715 TDAP VACCINE 7 YRS/> IM: CPT | Performed by: FAMILY MEDICINE

## 2024-03-01 RX ORDER — ERGOCALCIFEROL (VITAMIN D2) 10 MCG
TABLET ORAL
COMMUNITY

## 2024-03-01 RX ORDER — RIBOFLAVIN (VITAMIN B2) 100 MG
100 TABLET ORAL 3 TIMES DAILY
COMMUNITY

## 2024-03-01 RX ORDER — OMEGA-3/DHA/EPA/FISH OIL 60 MG-90MG
CAPSULE ORAL
COMMUNITY

## 2024-03-01 RX ORDER — UBIDECARENONE 60 MG
CAPSULE ORAL
COMMUNITY

## 2024-03-01 NOTE — PROGRESS NOTES
Preventive Care Visit  [facility] [place]  Luz Elise MD, Family Medicine  Mar 1, 2024    Assessment & Plan       ICD-10-CM    1. Encounter for routine adult medical exam with abnormal findings  Z00.01       2. Hyperlipidemia LDL goal <160  E78.5 Lipid panel reflex to direct LDL Fasting     Lipid panel reflex to direct LDL Fasting     Lipid panel reflex to direct LDL Fasting     ALT     AST      3. Screening for HIV (human immunodeficiency virus)- negative at [facility] 1/8/2024  Z11.4       4. Need for hepatitis C screening test- negative at [facility] 1/8/2024  Z11.59       5. Cervical cancer screening- to be done at Dr. Vazquez 's office this spring 2024  Z12.4       6. Lump in neck  R22.1 US Head Neck Soft Tissue    fr. 2021: left lateral just posterior to the Sternocleidomastoid muscle on the left -1cm x 5mm on palpation - soft , mobile      7. Low grade squamous intraepithelial lesion on cytologic smear of cervix (LGSIL)  R87.612       8. Encounter for gynecological examination without abnormal finding - sees Myrna Vazquez, at [facility] in [place], [place]- will have pap and breast exam done with her upcomging  Z01.419       9. Need for Tdap vaccination  Z23 TDAP 10-64Y (ADACEL,BOOSTRIX)      10. Herniation of lumbar intervertebral disc-L4-L5 - no radicular symptoms- seeing Dr. Elizabeth Hill- [facility]  M51.26       11. Hepatitis B virus serologic status unknown  Z78.9 Hepatitis B Surface Antibody     Hepatitis B Surface Antibody          Please, call our clinic or go to the ER immediately if signs or symptoms worsen or fail to improve as anticipated.     Return in about 1 year (around 3/1/2025) for Wellness/Preventative Visit, w/ Dr. GOLDEN for 40 min appt.     Patient has been advised of split billing requirements and indicates understanding: Yes  Ordering of each unique test  Prescription drug management  50 minutes spent by me on the date of the encounter doing chart review, history and exam,  documentation and further activities per the note .          Counseling  Appropriate preventive services were discussed with this patient, including applicable screening as appropriate for fall prevention, nutrition, physical activity, Tobacco-use cessation, weight loss and cognition.  Checklist reviewing preventive services available has been given to the patient.  Reviewed patient's diet, addressing concerns and/or questions.   She is at risk for lack of exercise and has been provided with information to increase physical activity for the benefit of her well-being.       MEDICATIONS:   Orders Placed This Encounter   Medications    Vitamin D, Cholecalciferol, 10 MCG (400 UNIT) TABS    vitamin B-Complex     Sig: Take 1 tablet by mouth daily    Coenzyme Q-10 60 MG CAPS    fish oil-omega-3 fatty acids 500 MG capsule    vitamin C (ASCORBIC ACID) 100 MG tablet     Sig: Take 100 mg by mouth 3 times daily          - Continue other medications without change  Regular exercise  See Patient Instructions    Subjective   Rica is a 38 year old, whom I have not seen since 3/15/2021 - that was pt's last prior visit within the [facility]  system - presenting for the followin. Hyperlipidemia LDL goal <160    2. Screening for HIV (human immunodeficiency virus)    3. Need for hepatitis C screening test    4. Cervical cancer screening    5. Lump in neck      Had normal labs done at [facility] on 2024 - cbc, cmv, RPR, TSH, cmp, hep A , hep b surface agn and core kenneth, HIV , HepC. ,  GC/chlamydia , all negative/normal.   Physical        3/1/2024     2:31 PM   Additional Questions   Roomed by Liza JONES      Desires follow up on neck lump - fr. 3/15/2021 last visit with me:  today = still there about same size.   left lateral just posterior to the Sternocleidomastoid muscle on the left -1cm x 5mm on palpation - soft , mobile     Pt also had an enlarged thyroid at 3/15/2021 visit with me as well - US  thyroidsacroiliac/soft-tissue neck at that time on 3/22/2021:   US HEAD NECK SOFT TISSUE   3/22/2021 3:47 PM      HISTORY: Lump on left side of neck. Enlarged thyroid. Lump in neck.     COMPARISON: None available     FINDINGS:  The right thyroid lobe measures 4.1 x 1.4 x 0.9 cm, the  left thyroid lobe measures 4.6 x 1.3 x 0.7 cm and the isthmus measures  0.2 cm in thickness. Normal thyroid parenchymal echogenicity. No  discrete thyroid nodule visualized.     There is 1.5 x 0.8 x 0.3 cm normal-appearing cervical lymph node in  the posterolateral aspect of the left neck area, corresponds to the  palpable lump. No abnormal-appearing cervical lymph nodes are  visualized.                                                                      IMPRESSION:  1. In the area of palpable lump in the left posterolateral neck, there  is normal-appearing cervical lymph node.  2. Normal thyroid gland, with no thyroid nodules.      Pt was seeing  Myrna Vazquez, - OB/Gyn previously as well. Started IVF with   [facility] - just started that.  Infertility is on her 's side.  Sperm   All of pt's infertility work up was normal.   Just  2/14/2024 - husb had a vasectomy previously.      just had negative testing at [facility] 1/2024 - HIV neg, HepC neg, HepB     Sees orthopedics [facility] for her back - Elizabeth Mogran- herniated L4-5 disc.      Health Care Directive  Patient does not have a Health Care Directive or Living Will: Discussed advance care planning with patient; however, patient declined at this time.    HPI              2/27/2024   General Health   How would you rate your overall physical health? Good   Feel stress (tense, anxious, or unable to sleep) To some extent   (!) STRESS CONCERN      2/27/2024   Nutrition   Three or more servings of calcium each day? (!) NO   Diet: Breakfast skipped   How many servings of fruit and vegetables per day? (!) 0-1   How many sweetened beverages each day? 0-1         2/27/2024   Exercise    Days per week of moderate/strenous exercise 1 day   Average minutes spent exercising at this level 30 min   (!) EXERCISE CONCERN      2/27/2024   Social Factors   Frequency of gathering with friends or relatives Once a week   Worry food won't last until get money to buy more No   Food not last or not have enough money for food? No   Do you have housing?  Yes   Are you worried about losing your housing? No   Lack of transportation? No   Unable to get utilities (heat,electricity)? No         2/27/2024   Dental   Dentist two times every year? Yes         2/27/2024   TB Screening   Were you born outside of [place]?  (!) YES  [place]         Today's PHQ-2 Score:       2/29/2024     3:53 PM   PHQ-2 ( 1999 [employer])   Q1: Little interest or pleasure in doing things 0   Q2: Feeling down, depressed or hopeless 0   PHQ-2 Score 0   Q1: Little interest or pleasure in doing things Not at all   Q2: Feeling down, depressed or hopeless Not at all   PHQ-2 Score 0           2/27/2024   Substance Use   Alcohol more than 3/day or more than 7/wk No   Do you use any other substances recreationally? No     Social History     Tobacco Use    Smoking status: Former     Packs/day: 0.50     Years: 9.00     Additional pack years: 0.00     Total pack years: 4.50     Types: Cigarettes     Quit date: 2014     Years since quitting: 10.1    Smokeless tobacco: Never   Substance Use Topics    Alcohol use: Never    Drug use: Never         2/27/2024   Breast Cancer Screening   Family history of breast, colon, or ovarian cancer? No / Unknown                2/27/2024   One time HIV Screening   Previous HIV test? Yes         2/27/2024   STI Screening   New sexual partner(s) since last STI/HIV test? No     History of abnormal Pap smear: YES - updated in Problem List and Health Maintenance accordingly             2/27/2024   Contraception/Family Planning   Questions about contraception or family planning No        Reviewed and updated  as needed this visit by Provider                  Patient Active Problem List   Diagnosis    Encounter for gynecological examination without abnormal finding - sees Myrna Vazquez, at [facility] in [place], [place]    Herniation of lumbar intervertebral disc-L4-L5 - no radicular symptoms- seeing Dr. Elizabeth MUÑIZ    Hyperlipidemia LDL goal <160    Tension-type headache, not intractable, unspecified chronicity pattern    Low grade squamous intraepithelial lesion on cytologic smear of cervix (LGSIL)       Current Outpatient Medications   Medication Sig Dispense Refill    Coenzyme Q-10 60 MG CAPS       fish oil-omega-3 fatty acids 500 MG capsule       Prenatal Vit-Fe Fumarate-FA (PRENATAL VITAMIN PLUS LOW IRON) 27-1 MG TABS Take 1 tablet by mouth daily      vitamin B-Complex Take 1 tablet by mouth daily      vitamin C (ASCORBIC ACID) 100 MG tablet Take 100 mg by mouth 3 times daily      Vitamin D, Cholecalciferol, 10 MCG (400 UNIT) TABS           No Known Allergies       Past Medical History:   Diagnosis Date    Encounter for gynecological examination without abnormal finding     sees Myrna Vazquez, at [facility] in [place], [place]    Herniation of lumbar intervertebral disc 2016    chronic LBP - herniation L4-L5 - no radicular symptoms- seeing Dr. Bharati MUÑIZ    Hyperlipidemia LDL goal <160     doesn't run in family    Low grade squamous intraepithelial lesion on cytologic smear of cervix (LGSIL)     s/p LEEP in  - at [facility] in [place] - normal paps since then    Tension-type headache, not intractable, unspecified chronicity pattern     feels knots in back of neck     Past Surgical History:   Procedure Laterality Date    LEEP TX, CERVICAL      at [facility] in [place] - normal paps since then    XR FOOT SURGERY QUIRINO LEFT      reattached dorsal tendon after fall into glass table while hanging a picture     OB History    Para Term  AB Living   1 0 0 0 1 0   SAB IAB Ectopic Multiple Live  Births   0 1 0 0 0      # Outcome Date GA Lbr Moises/2nd Weight Sex Delivery Anes PTL Lv   1 IAB               Obstetric Comments   Had a TAB around 6wks EGA - at age 26      Lab work is in process  Labs reviewed in EPIC  BP Readings from Last 3 Encounters:   03/01/24 122/78   03/15/21 110/86   02/08/20 118/71    Wt Readings from Last 3 Encounters:   03/01/24 61.2 kg (135 lb)   03/15/21 52.2 kg (115 lb)   02/08/20 65.8 kg (145 lb)                  Patient Active Problem List   Diagnosis    Encounter for gynecological examination without abnormal finding - sees Myrna Vazquez, at [facility] in [place], [place]    Herniation of lumbar intervertebral disc-L4-L5 - no radicular symptoms- seeing Dr. Elizabeth Hill- [facility]    Hyperlipidemia LDL goal <160    Tension-type headache, not intractable, unspecified chronicity pattern    Low grade squamous intraepithelial lesion on cytologic smear of cervix (LGSIL)     Past Surgical History:   Procedure Laterality Date    LEEP TX, CERVICAL  2014    at [facility] in [place] - normal paps since then    XR FOOT SURGERY QUIRINO LEFT      reattached dorsal tendon after fall into glass table while hanging a picture       Social History     Tobacco Use    Smoking status: Former     Packs/day: 0.50     Years: 9.00     Additional pack years: 0.00     Total pack years: 4.50     Types: Cigarettes     Quit date: 2014     Years since quitting: 10.1    Smokeless tobacco: Never   Substance Use Topics    Alcohol use: Never     Family History   Problem Relation Age of Onset    No Known Problems Mother     No Known Problems Father          Current Outpatient Medications   Medication Sig Dispense Refill    Coenzyme Q-10 60 MG CAPS       fish oil-omega-3 fatty acids 500 MG capsule       Prenatal Vit-Fe Fumarate-FA (PRENATAL VITAMIN PLUS LOW IRON) 27-1 MG TABS Take 1 tablet by mouth daily      vitamin B-Complex Take 1 tablet by mouth daily      vitamin C (ASCORBIC ACID) 100 MG tablet Take 100 mg by mouth 3  "times daily      Vitamin D, Cholecalciferol, 10 MCG (400 UNIT) TABS        No Known Allergies  Recent Labs   Lab Test 03/15/21  1058   TSH 1.80          Review of Systems  Constitutional, HEENT, cardiovascular, pulmonary, GI, , musculoskeletal, neuro, skin, endocrine and psych systems are negative, except as otherwise noted.     Objective    Exam  Pulse 99   Temp 97.6  F (36.4  C) (Tympanic)   Resp 16   Ht 1.67 m (5' 5.75\")   Wt 61.2 kg (135 lb)   LMP 02/04/2024   SpO2 98%   BMI 21.96 kg/m     Estimated body mass index is 21.96 kg/m  as calculated from the following:    Height as of this encounter: 1.67 m (5' 5.75\").    Weight as of this encounter: 61.2 kg (135 lb).    Physical Exam  GENERAL: alert and no distress  EYES: Eyes grossly normal to inspection, PERRL and conjunctivae and sclerae normal  HENT: ear canals and TM's normal, nose and mouth without ulcers or lesions  NECK: no adenopathy, no asymmetry, masses, or scars  RESP: lungs clear to auscultation - no rales, rhonchi or wheezes  CV: regular rate and rhythm, normal S1 S2, no S3 or S4, no murmur, click or rub, no peripheral edema  ABDOMEN: soft, nontender, no hepatosplenomegaly, no masses and bowel sounds normal  MS: no gross musculoskeletal defects noted, no edema  SKIN: no suspicious lesions or rashes  NEURO: Normal strength and tone, mentation intact and speech normal  PSYCH: mentation appears normal, affect normal/bright    Breast and pelvic exams to be done at [facility] upcoming.    Release of Information signed to get record of tests. From that and previous visits.     Signed Electronically by: Luz Elise MD    "

## 2024-03-01 NOTE — PATIENT INSTRUCTIONS
[facility]  [address] [place] [id number]  Office: [phone]   Fax:    [phone]       Preventive Care Advice   This is general advice given by our system to help you stay healthy. However, your care team may have specific advice just for you. Please talk to your care team about your preventive care needs.  Nutrition  Eat 5 or more servings of fruits and vegetables each day.  Try wheat bread, brown rice and whole grain pasta (instead of white bread, rice, and pasta).  Get enough calcium and vitamin D. Check the label on foods and aim for 100% of the RDA (recommended daily allowance).  Lifestyle  Exercise at least 150 minutes each week   (30 minutes a day, 5 days a week).  Do muscle strengthening activities 2 days a week. These help control your weight and prevent disease.  No smoking.  Wear sunscreen to prevent skin cancer.  Have a dental exam and cleaning every 6 months.  Yearly exams  See your health care team every year to talk about:  Any changes in your health.  Any medicines your care team has prescribed.  Preventive care, family planning, and ways to prevent chronic diseases.  Shots (vaccines)   HPV shots (up to age 26), if you've never had them before.  Hepatitis B shots (up to age 59), if you've never had them before.  COVID-19 shot: Get this shot when it's due.  Flu shot: Get a flu shot every year.  Tetanus shot: Get a tetanus shot every 10 years.  Pneumococcal, hepatitis A, and RSV shots: Ask your care team if you need these based on your risk.  Shingles shot (for age 50 and up).  General health tests  Diabetes screening:  Starting at age 35, Get screened for diabetes at least every 3 years.  If you are younger than age 35, ask your care team if you should be screened for diabetes.  Cholesterol test: At age 39, start having a cholesterol test every 5 years, or more often if advised.  Bone density scan (DEXA): At age 50, ask your care team if you should have this  scan for osteoporosis (brittle bones).  Hepatitis C: Get tested at least once in your life.  STIs (sexually transmitted infections)  Before age 24: Ask your care team if you should be screened for STIs.  After age 24: Get screened for STIs if you're at risk. You are at risk for STIs (including HIV) if:  You are sexually active with more than one person.  You don't use condoms every time.  You or a partner was diagnosed with a sexually transmitted infection.  If you are at risk for HIV, ask about PrEP medicine to prevent HIV.  Get tested for HIV at least once in your life, whether you are at risk for HIV or not.  Cancer screening tests  Cervical cancer screening: If you have a cervix, begin getting regular cervical cancer screening tests at age 21. Most people who have regular screenings with normal results can stop after age 65. Talk about this with your provider.  Breast cancer scan (mammogram): If you've ever had breasts, begin having regular mammograms starting at age 40. This is a scan to check for breast cancer.  Colon cancer screening: It is important to start screening for colon cancer at age 45.  Have a colonoscopy test every 10 years (or more often if you're at risk) Or, ask your provider about stool tests like a FIT test every year or Cologuard test every 3 years.  To learn more about your testing options, visit: https://www.[email or web address].pdf.  For help making a decision, visit: https://bit.ly/kb45987.  Prostate cancer screening test: If you have a prostate and are age 55 to 69, ask your provider if you would benefit from a yearly prostate cancer screening test.  Lung cancer screening: If you are a current or former smoker age 50 to 80, ask your care team if ongoing lung cancer screenings are right for you.  For informational purposes only. Not to replace the advice of your health care provider. Copyright   2023 [employer]. All rights reserved. Clinically reviewed by the [facility]  "[employer] [facility]. [employer] 705418 - REV 01/24.    Eating Healthy Foods: Care Instructions  With every meal, you can make healthy food choices. Try to eat a variety of fruits, vegetables, whole grains, lean proteins, and low-fat dairy products. This can help you get the right balance of nutrients, including vitamins and minerals. Small changes add up over time. You can start by adding one healthy food to your meals each day.    Try to make half your plate fruits and vegetables, one-fourth whole grains, and one-fourth lean proteins. Try including dairy with your meals.   Eat more fruits and vegetables. Try to have them with most meals and snacks.   Foods for healthy eating    Fruits    These can be fresh, frozen, canned, or dried.  Try to choose whole fruit rather than fruit juice.  Eat a variety of colors.    Vegetables    These can be fresh, frozen, canned, or dried.  Beans, peas, and lentils count too.    Whole grains    Choose whole-grain breads, cereals, and noodles.  Try brown rice.    Lean proteins    These can include lean meat, poultry, fish, and eggs.  You can also have tofu, beans, peas, lentils, nuts, and seeds.    Dairy    Try milk, yogurt, and cheese.  Choose low-fat or fat-free when you can.  If you need to, use lactose-free milk or fortified plant-based milk products, such as soy milk.    Water    Drink water when you're thirsty.  Limit sugar-sweetened drinks, including soda, fruit drinks, and sports drinks.  Where can you learn more?  Go to https://www.healthDynadmic.net/patiented  Enter T756 in the search box to learn more about \"Eating Healthy Foods: Care Instructions.\"  Current as of: February 28, 2023               Content Version: 13.8    8130-2891 [employer].   Care instructions adapted under license by your healthcare professional. If you have questions about a medical condition or this instruction, always ask your healthcare professional. [employer] " disclaims any warranty or liability for your use of this information.      Learning About Stress  What is stress?     Stress is your body's response to a hard situation. Your body can have a physical, emotional, or mental response. Stress is a fact of life for most people, and it affects everyone differently. What causes stress for you may not be stressful for someone else.  A lot of things can cause stress. You may feel stress when you go on a job interview, take a test, or run a race. This kind of short-term stress is normal and even useful. It can help you if you need to work hard or react quickly. For example, stress can help you finish an important job on time.  Long-term stress is caused by ongoing stressful situations or events. Examples of long-term stress include long-term health problems, ongoing problems at work, or conflicts in your family. Long-term stress can harm your health.  How does stress affect your health?  When you are stressed, your body responds as though you are in danger. It makes hormones that speed up your heart, make you breathe faster, and give you a burst of energy. This is called the fight-or-flight stress response. If the stress is over quickly, your body goes back to normal and no harm is done.  But if stress happens too often or lasts too long, it can have bad effects. Long-term stress can make you more likely to get sick, and it can make symptoms of some diseases worse. If you tense up when you are stressed, you may develop neck, shoulder, or low back pain. Stress is linked to high blood pressure and heart disease.  Stress also harms your emotional health. It can make you jo, tense, or depressed. Your relationships may suffer, and you may not do well at work or school.  What can you do to manage stress?  You can try these things to help manage stress:   Do something active. Exercise or activity can help reduce stress. Walking is a great way to get started. Even everyday  activities such as housecleaning or yard work can help.  Try yoga or mario chi. These techniques combine exercise and meditation. You may need some training at first to learn them.  Do something you enjoy. For example, listen to music or go to a movie. Practice your hobby or do volunteer work.  Meditate. This can help you relax, because you are not worrying about what happened before or what may happen in the future.  Do guided imagery. Imagine yourself in any setting that helps you feel calm. You can use online videos, books, or a teacher to guide you.  Do breathing exercises. For example:  From a standing position, bend forward from the waist with your knees slightly bent. Let your arms dangle close to the floor.  Breathe in slowly and deeply as you return to a standing position. Roll up slowly and lift your head last.  Hold your breath for just a few seconds in the standing position.  Breathe out slowly and bend forward from the waist.  Let your feelings out. Talk, laugh, cry, and express anger when you need to. Talking with supportive friends or family, a counselor, or a david leader about your feelings is a healthy way to relieve stress. Avoid discussing your feelings with people who make you feel worse.  Write. It may help to write about things that are bothering you. This helps you find out how much stress you feel and what is causing it. When you know this, you can find better ways to cope.  What can you do to prevent stress?  You might try some of these things to help prevent stress:  Manage your time. This helps you find time to do the things you want and need to do.  Get enough sleep. Your body recovers from the stresses of the day while you are sleeping.  Get support. Your family, friends, and community can make a difference in how you experience stress.  Limit your news feed. Avoid or limit time on social media or news that may make you feel stressed.  Do something active. Exercise or activity can help  "reduce stress. Walking is a great way to get started.  Where can you learn more?  Go to https://www.[employer].net/patiented  Enter N032 in the search box to learn more about \"Learning About Stress.\"  Current as of: February 26, 2023               Content Version: 13.8    4127-9704 [employer].   Care instructions adapted under license by your healthcare professional. If you have questions about a medical condition or this instruction, always ask your healthcare professional. [employer] disclaims any warranty or liability for your use of this information.    Thank you so much or choosing [facility]  for your Health Care. It was a pleasure seeing you at your visit today! Please contact us with any questions or concerns you may have.                   Luz Elise MD                              To reach your [facility] care team after hours call:   [phone] press #2 \"to speak with your care team\".  This will get you to our clinic instead of routing to central [facility]  scheduling.     PLEASE NOTE OUR HOURS HAVE CHANGED secondary to COVID-19 coronavirus pandemic, as we are trying to minimize patient exposure to the virus,  which is now widespread in the [facility].  These hours may change with very little notice.  We apologize for any inconvenience.       Our current clinic hours are:          Monday- Thursday   7:00am - 6:00pm  in person.      Friday  7:00am- 5:00pm                       Saturday and Sunday : Closed to in person and virtual visits        We have telephone and virtual visit times available between    7:00am - 6pm on Monday-Friday as well.                                                Phone:  [phone]      Our pharmacy hours: Monday through Friday 8:00am to 5:00pm                        Saturday - 9:00 am to 12 noon       Sunday : Closed.              Phone:  [phone]              ###  Please " note: at this time we are not accepting any walk-in visits. ###      There is also information available at our web site:  [email or web address]    If your provider ordered any lab tests and you do not receive the results within 10 business days, please call the clinic.    If you need a medication refill please contact your pharmacy.  Please allow 3 business days for your refill to be completed.    Our clinic offers telephone visits and e visits.  Please ask one of your team members to explain more.      Use [employer] (secure email communication and access to your chart) to send your primary care provider a message or make an appointment. Ask someone on your Team how to sign up for [employer].

## 2024-03-02 LAB
CHOLEST SERPL-MCNC: 239 MG/DL
FASTING STATUS PATIENT QL REPORTED: YES
HBV SURFACE AB SERPL IA-ACNC: <3.5 M[IU]/ML
HBV SURFACE AB SERPL IA-ACNC: NONREACTIVE M[IU]/ML
HDLC SERPL-MCNC: 53 MG/DL
LDLC SERPL CALC-MCNC: 166 MG/DL
NONHDLC SERPL-MCNC: 186 MG/DL
TRIGL SERPL-MCNC: 98 MG/DL

## 2024-03-05 ENCOUNTER — MYC MEDICAL ADVICE (OUTPATIENT)
Dept: FAMILY MEDICINE | Facility: CLINIC | Age: 39
End: 2024-03-05
Payer: COMMERCIAL

## 2024-03-05 PROBLEM — R22.1 LUMP IN NECK: Status: ACTIVE | Noted: 2024-03-05

## 2024-03-05 PROBLEM — Z12.4 CERVICAL CANCER SCREENING: Status: ACTIVE | Noted: 2024-03-05

## 2024-03-05 PROBLEM — Z11.59 NEED FOR HEPATITIS C SCREENING TEST: Status: ACTIVE | Noted: 2024-03-05

## 2024-03-05 PROBLEM — Z11.4 SCREENING FOR HIV (HUMAN IMMUNODEFICIENCY VIRUS): Status: ACTIVE | Noted: 2024-03-05

## 2024-03-05 PROBLEM — Z78.9 HEPATITIS B VIRUS SEROLOGIC STATUS UNKNOWN: Status: ACTIVE | Noted: 2024-03-05

## 2024-05-31 ENCOUNTER — HOSPITAL ENCOUNTER (OUTPATIENT)
Dept: ULTRASOUND IMAGING | Facility: CLINIC | Age: 39
Discharge: HOME | End: 2024-05-31
Attending: FAMILY MEDICINE | Admitting: FAMILY MEDICINE
Payer: COMMERCIAL

## 2024-05-31 DIAGNOSIS — R22.1 LUMP IN NECK: ICD-10-CM

## 2024-05-31 PROCEDURE — 76536 US EXAM OF HEAD AND NECK: CPT

## 2024-08-29 DIAGNOSIS — Z31.41 FERTILITY TESTING: Primary | ICD-10-CM

## 2024-08-30 ENCOUNTER — LAB (OUTPATIENT)
Dept: LAB | Facility: CLINIC | Age: 39
End: 2024-08-30
Payer: COMMERCIAL

## 2024-08-30 DIAGNOSIS — Z31.41 FERTILITY TESTING: ICD-10-CM

## 2024-08-30 DIAGNOSIS — E78.5 HYPERLIPIDEMIA LDL GOAL <160: Primary | ICD-10-CM

## 2024-08-30 LAB
ERYTHROCYTE [DISTWIDTH] IN BLOOD BY AUTOMATED COUNT: 13.7 % (ref 10–15)
HCT VFR BLD AUTO: 40.7 % (ref 35–47)
HGB BLD-MCNC: 13.5 G/DL (ref 11.7–15.7)
MCH RBC QN AUTO: 30.5 PG (ref 26.5–33)
MCHC RBC AUTO-ENTMCNC: 33.2 G/DL (ref 31.5–36.5)
MCV RBC AUTO: 92 FL (ref 78–100)
PLATELET # BLD AUTO: 426 10E3/UL (ref 150–450)
RBC # BLD AUTO: 4.43 10E6/UL (ref 3.8–5.2)
WBC # BLD AUTO: 8.5 10E3/UL (ref 4–11)

## 2024-08-30 PROCEDURE — 85027 COMPLETE CBC AUTOMATED: CPT

## 2024-08-30 PROCEDURE — 36415 COLL VENOUS BLD VENIPUNCTURE: CPT

## 2024-10-03 ENCOUNTER — LAB (OUTPATIENT)
Dept: LAB | Facility: CLINIC | Age: 39
End: 2024-10-03
Payer: COMMERCIAL

## 2024-10-03 DIAGNOSIS — Z31.83 ENCOUNTER FOR ASSISTED REPRODUCTIVE FERTILITY CYCLE: Primary | ICD-10-CM

## 2024-10-03 DIAGNOSIS — Z31.83 ENCOUNTER FOR ASSISTED REPRODUCTIVE FERTILITY CYCLE: ICD-10-CM

## 2024-10-03 PROCEDURE — 36415 COLL VENOUS BLD VENIPUNCTURE: CPT

## 2024-10-03 PROCEDURE — 84144 ASSAY OF PROGESTERONE: CPT

## 2024-10-04 LAB — PROGEST SERPL-MCNC: 0.3 NG/ML

## 2024-10-08 ENCOUNTER — LAB (OUTPATIENT)
Dept: LAB | Facility: CLINIC | Age: 39
End: 2024-10-08
Payer: COMMERCIAL

## 2024-10-08 DIAGNOSIS — Z31.83 ENCOUNTER FOR ASSISTED REPRODUCTIVE FERTILITY CYCLE: Primary | ICD-10-CM

## 2024-10-08 DIAGNOSIS — Z31.83 ENCOUNTER FOR ASSISTED REPRODUCTIVE FERTILITY CYCLE: ICD-10-CM

## 2024-10-08 LAB — PROGEST SERPL-MCNC: 30.7 NG/ML

## 2024-10-08 PROCEDURE — 36415 COLL VENOUS BLD VENIPUNCTURE: CPT

## 2024-10-08 PROCEDURE — 84144 ASSAY OF PROGESTERONE: CPT

## 2024-12-12 ENCOUNTER — TRANSFERRED RECORDS (OUTPATIENT)
Dept: MULTI SPECIALTY CLINIC | Facility: CLINIC | Age: 39
End: 2024-12-12

## 2024-12-12 LAB — PAP SMEAR - HIM PATIENT REPORTED: NEGATIVE

## 2025-03-05 ENCOUNTER — OFFICE VISIT (OUTPATIENT)
Dept: FAMILY MEDICINE | Facility: CLINIC | Age: 40
End: 2025-03-05
Payer: COMMERCIAL

## 2025-03-05 VITALS
TEMPERATURE: 97.9 F | OXYGEN SATURATION: 100 % | DIASTOLIC BLOOD PRESSURE: 80 MMHG | HEART RATE: 113 BPM | WEIGHT: 126 LBS | BODY MASS INDEX: 20.99 KG/M2 | SYSTOLIC BLOOD PRESSURE: 124 MMHG | HEIGHT: 65 IN | RESPIRATION RATE: 16 BRPM

## 2025-03-05 DIAGNOSIS — L72.9 FOLLICULAR CYST OF SKIN AND SUBCUTANEOUS TISSUE: ICD-10-CM

## 2025-03-05 DIAGNOSIS — Z28.20 VACCINE REFUSED BY PATIENT: ICD-10-CM

## 2025-03-05 DIAGNOSIS — R22.1 LUMP IN NECK: Primary | ICD-10-CM

## 2025-03-05 PROCEDURE — 3074F SYST BP LT 130 MM HG: CPT | Performed by: FAMILY MEDICINE

## 2025-03-05 PROCEDURE — 3079F DIAST BP 80-89 MM HG: CPT | Performed by: FAMILY MEDICINE

## 2025-03-05 PROCEDURE — 99213 OFFICE O/P EST LOW 20 MIN: CPT | Performed by: FAMILY MEDICINE

## 2025-03-05 NOTE — PROGRESS NOTES
Assessment & Plan :       ICD-10-CM    1. Lump in neck- left posterolateral 1.5 x 0.8 x 0.3cm stable on US since 2021 - pt desires repeat US  R22.1 Adult ENT  Referral     US Head Neck Soft Tissue      2. Follicular cyst of skin and subcutaneous tissue- pt desires ultrasound to be sure- 8mm diameter left upper abdomen/lower chest- barely visible central pore under magnification  L72.9 US Soft Tissue Chest Wall or Upper Back      3. Vaccine refused by patient- influenza and covid-19 refused again today 3/5/2025  Z28.20             Return in about 1 week (around 3/12/2025) for Wellness/Preventative Visit, w/ Dr. GOLDEN for 40 min appt, in person.  Future Appointments 3/5/2025 - 9/1/2025        Date Visit Type Length Department Provider     3/12/2025  2:40 PM PREVENTATIVE ADULT 40 min [facility] Luz Elise MD    Location Instructions:     [facility] is located at [address] [place] [address], along [address] Free parking is available; access the lot by turning north from [address] onto [address], then west onto [address].                      Assessment & Plan  - Lump in neck: Stable, normal-appearing but slightly large lymph node on the left posterolateral neck, feels unchanged at  1.5cm  by 0.8cm by 0.3 cm. Appears stable on exam over time. Referral to ENT specialist for further evaluation.    - Follicular cyst of skin and subcutaneous tissue: Subcutaneous lump below the left breast, approximately 8mm in diameter, likely a sebaceous cyst or lipoma. Not suspicious. Ultrasound of the left upper abdomen/lower chest to further evaluate the lump per pt request for her peace of mind.      Please, call our clinic or go to the ER immediately if signs or symptoms worsen or fail to improve as anticipated.       MEDICATIONS:   No orders of the defined types were placed in this encounter.         - Continue other medications without change  Regular exercise  See  Patient Instructions    Subjective   Rica is a 39 year old, presenting for the following health issues:  Derm Problem        3/5/2025     1:33 PM   Additional Questions   Roomed by Virgen RIVERA   Accompanied by self     History of Present Illness       Reason for visit:  Pea size mass on rib cage  Symptom onset:  1-3 days ago  Symptoms include:  None  Symptom intensity:  Mild  Symptom progression:  Improving  Had these symptoms before:  No  What makes it worse:  No  What makes it better:  No   She is taking medications regularly.   Rica Holguin, 39 years    Subcutaneous Lump  She noticed a bump on Monday morning while getting dressed for work. It's just under her left breast, near the left upper abdomen, close to the rib cage. At first, it felt prickly, but by Tuesday and today, it feels like a squished pea with no sensations. There's been no pus, fluid, redness, or pain. She hasn't had anything like it before.    Thyroid/Lymph Node Enlargement  She has a history of thyroid or lymph node enlargement on her neck. Over the past few years, she's had three ultrasounds, which showed a stable, normal-appearing but slightly large lymph node on the left posterolateral neck, measuring 1.5 by 0.8 by 0.3 cm. She's asked for referrals for further evaluation.    Immunization History  She didn't get a flu shot this fall. She got COVID-19 vaccinations when they first became available due to her job. She remembers being persuaded to get a tetanus shot during a previous visit.  History of Present Illness-  Rica Holguin, 39 years    Subcutaneous Lump  She noticed a bump on Monday morning while getting dressed for work. It's just under her left breast, near the left upper abdomen, close to the rib cage. At first, it felt prickly, but by Tuesday and today, it feels like a squished pea with no sensations. There's been no pus, fluid, redness, or pain. She hasn't had anything like it before.    Thyroid/Lymph Node Enlargement  She has  a history of thyroid or lymph node enlargement on her neck. Over the past few years, she's had three ultrasounds, which showed a stable, normal-appearing but slightly large lymph node on the left posterolateral neck, measuring 1.5 by 0.8 by 0.3 cm. She's asked for referrals for further evaluation.    Immunization History  She didn't get a flu shot this fall. She got COVID-19 vaccinations when they first became available due to her job. She remembers being persuaded to get a tetanus shot during a previous visit.    Patient Active Problem List   Diagnosis    Encounter for gynecological examination without abnormal finding - sees Myrna Vazquez, at [facility] in [place], [place]    Herniation of lumbar intervertebral disc-L4-L5 - no radicular symptoms- seeing Dr. Elizabeth Hill- [facility]    Hyperlipidemia LDL goal <160    Tension-type headache, not intractable, unspecified chronicity pattern    Low grade squamous intraepithelial lesion on cytologic smear of cervix (LGSIL)    Screening for HIV (human immunodeficiency virus)- negative at [facility] 1/8/2024    Need for hepatitis C screening test- negative at [facility] 1/8/2024    Cervical cancer screening- to be done at Dr. Vazquez 's office this spring 2024    Lump in neck- left posterolateral 1.5 x 0.8 x 0.3cm stable on US since 2021 - pt desires repeat US    Hepatitis B virus serologic status unknown - tested negative 3/1/2024 even after 3 immunizations in [id number]       Current Outpatient Medications   Medication Sig Dispense Refill    Coenzyme Q-10 60 MG CAPS       fish oil-omega-3 fatty acids 500 MG capsule       Prenatal Vit-Fe Fumarate-FA (PRENATAL VITAMIN PLUS LOW IRON) 27-1 MG TABS Take 1 tablet by mouth daily      vitamin B-Complex Take 1 tablet by mouth daily      vitamin C (ASCORBIC ACID) 100 MG tablet Take 100 mg by mouth 3 times daily      Vitamin D, Cholecalciferol, 10 MCG (400 UNIT) TABS           No Known Allergies      Review of Systems  Constitutional, HEENT, cardiovascular,  "pulmonary, GI, , musculoskeletal, neuro, skin, endocrine and psych systems are negative, except as otherwise noted.      Objective    /80   Pulse 113   Temp 97.9  F (36.6  C) (Tympanic)   Resp 16   Ht 1.651 m (5' 5\")   Wt 57.2 kg (126 lb)   SpO2 100%   BMI 20.97 kg/m    Body mass index is 20.97 kg/m .  Physical Exam   GENERAL: alert and no distress  EYES: Eyes grossly normal to inspection, PERRL and conjunctivae and sclerae normal  HENT: ear canals and TM's normal, nose and mouth without ulcers or lesions  NECK: there is a Lump in neck- left posterolateral 1.5 x 0.8 x 0.3cm stable feeling slightly compressible  lymph node,  no other adenopathy, no asymmetry, masses, or scars  RESP: lungs clear to auscultation - no rales, rhonchi or wheezes  CV: regular rate and rhythm, normal S1 S2, no S3 or S4, no murmur, click or rub, no peripheral edema  ABDOMEN: soft, nontender, no hepatosplenomegaly, no masses and bowel sounds normal  MS: no gross musculoskeletal defects noted, no edema  SKIN: no suspicious lesions or rashes, but there is what feels like a Follicular cyst of skin and subcutaneous tissue- pt desires ultrasound to be sure- 8mm diameter circular mass left upper abdomen/left lower chest with a barely visible central pore under magnification - not red, not tender, not fluctuant - slightly firm, but mildly partially compressible- superficial /anterior to left lower anterior ribs in the mid clavicular line. ? Sebaceous cyst vs. Lipoma vs. Other.  NEURO: Normal strength and tone, mentation intact and speech normal  PSYCH: mentation appears normal, affect normal/bright    Lab on 10/08/2024   Component Date Value Ref Range Status    Progesterone 10/08/2024 30.7  ng/mL Final      Healthy Postmenopausal Women:        Postmenopause: <=0.1 ng/mL     Healthy Pregnant Women:        1st Trimester: 11.0-44.3 ng/mL        2nd Trimester: 25.4-83.4 ng/mL        3rd Trimester: 58.7-214.0 ng/mL     Healthy Women Cycle " Phase:        Follicular: <0.1-0.2 ng/mL        Ovulation: 0.1-4.1 ng/mL        Luteal: 4.1-14.5 ng/mL    Healthy Women Cycle Sub Phase:       Early Follicular: <0.1-0.3 ng/mL       Intermediate Follicular: <0.1-0.2 ng/mL       Late Follicular: <0.1-0.2 ng/mL       Ovulation: <0.1-2.4 ng/mL       Early Luteal: 2.4-15.1 ng/mL       Intermediate Luteal: 4.8-20.9 ng/mL       Late Luteal: 0.5-13.5 ng/mL             Signed Electronically by: Luz Elise MD

## 2025-03-05 NOTE — PATIENT INSTRUCTIONS
"Please, call our clinic or go to the ER immediately if signs or symptoms worsen or fail to improve as anticipated.     Return in about 1 week (around 3/12/2025) for Wellness/Preventative Visit, w/ Dr. GOLDEN for 40 min appt, in person.     [facility] radiology scheduling [phone] or [phone] - ask for ultrasound department.     Thank you so much or choosing [facility]  for your Health Care. It was a pleasure seeing you at your visit today! Please contact us with any questions or concerns you may have.                   Luz Elise MD                              To reach your [facility] care team after hours call:   [phone] press #2 \"to speak with your care team\".  This will get you to our clinic instead of routing to central [facility]  scheduling.     PLEASE NOTE OUR HOURS HAVE CHANGED secondary to COVID-19 coronavirus pandemic, as we are trying to minimize patient exposure to the virus,  which is now widespread in the [facility].  These hours may change with very little notice.  We apologize for any inconvenience.       Our current clinic hours are:          Monday- Thursday   7:00am - 6:00pm  in person.      Friday  7:00am- 5:00pm                       Saturday and Sunday : Closed to in person and virtual visits        We have telephone and virtual visit times available between    7:00am - 6pm on Monday-Friday as well.                                                Phone:  [phone]      Our pharmacy hours: Monday through Friday 8:00am to 5:00pm                        Saturday - 9:00 am to 12 noon       Sunday : Closed.              Phone:  [phone]              ###  Please note: at this time we are not accepting any walk-in visits. ###      There is also information available at our web site:  [email or web address]    If your provider ordered any lab tests and you do not receive the results within 10 business days, please call the " clinic.    If you need a medication refill please contact your pharmacy.  Please allow 3 business days for your refill to be completed.    Our clinic offers telephone visits and e visits.  Please ask one of your team members to explain more.      Use [employer]hart (secure email communication and access to your chart) to send your primary care provider a message or make an appointment. Ask someone on your Team how to sign up for 5appt.

## 2025-03-09 SDOH — HEALTH STABILITY: PHYSICAL HEALTH: ON AVERAGE, HOW MANY MINUTES DO YOU ENGAGE IN EXERCISE AT THIS LEVEL?: 30 MIN

## 2025-03-09 SDOH — HEALTH STABILITY: PHYSICAL HEALTH: ON AVERAGE, HOW MANY DAYS PER WEEK DO YOU ENGAGE IN MODERATE TO STRENUOUS EXERCISE (LIKE A BRISK WALK)?: 2 DAYS

## 2025-03-09 ASSESSMENT — SOCIAL DETERMINANTS OF HEALTH (SDOH): HOW OFTEN DO YOU GET TOGETHER WITH FRIENDS OR RELATIVES?: ONCE A WEEK

## 2025-03-12 ENCOUNTER — OFFICE VISIT (OUTPATIENT)
Dept: FAMILY MEDICINE | Facility: CLINIC | Age: 40
End: 2025-03-12
Payer: COMMERCIAL

## 2025-03-12 VITALS
TEMPERATURE: 97.9 F | SYSTOLIC BLOOD PRESSURE: 115 MMHG | HEART RATE: 109 BPM | RESPIRATION RATE: 14 BRPM | WEIGHT: 127.3 LBS | OXYGEN SATURATION: 98 % | BODY MASS INDEX: 21.21 KG/M2 | DIASTOLIC BLOOD PRESSURE: 80 MMHG | HEIGHT: 65 IN

## 2025-03-12 DIAGNOSIS — Z98.890 HISTORY OF IN VITRO FERTILIZATION: ICD-10-CM

## 2025-03-12 DIAGNOSIS — M25.562 ACUTE PAIN OF LEFT KNEE: ICD-10-CM

## 2025-03-12 DIAGNOSIS — R79.89 ELEVATED PROLACTIN LEVEL: ICD-10-CM

## 2025-03-12 DIAGNOSIS — N84.0 UTERINE POLYP: ICD-10-CM

## 2025-03-12 DIAGNOSIS — M76.892 HAMSTRING TENDONITIS OF LEFT THIGH: ICD-10-CM

## 2025-03-12 DIAGNOSIS — D25.9 UTERINE LEIOMYOMA, UNSPECIFIED LOCATION: ICD-10-CM

## 2025-03-12 DIAGNOSIS — Z00.00 ROUTINE GENERAL MEDICAL EXAMINATION AT A HEALTH CARE FACILITY: Primary | ICD-10-CM

## 2025-03-12 DIAGNOSIS — E78.01 FAMILIAL HYPERCHOLESTEROLEMIA: ICD-10-CM

## 2025-03-12 DIAGNOSIS — E78.5 HYPERLIPIDEMIA LDL GOAL <160: ICD-10-CM

## 2025-03-12 PROCEDURE — 3074F SYST BP LT 130 MM HG: CPT | Performed by: FAMILY MEDICINE

## 2025-03-12 PROCEDURE — 3079F DIAST BP 80-89 MM HG: CPT | Performed by: FAMILY MEDICINE

## 2025-03-12 PROCEDURE — 99395 PREV VISIT EST AGE 18-39: CPT | Performed by: FAMILY MEDICINE

## 2025-03-12 RX ORDER — CABERGOLINE 0.5 MG/1
0.25 TABLET ORAL
COMMUNITY
Start: 2025-03-13

## 2025-03-12 SDOH — HEALTH STABILITY: PHYSICAL HEALTH: ON AVERAGE, HOW MANY MINUTES DO YOU ENGAGE IN EXERCISE AT THIS LEVEL?: 30 MIN

## 2025-03-12 SDOH — HEALTH STABILITY: PHYSICAL HEALTH: ON AVERAGE, HOW MANY DAYS PER WEEK DO YOU ENGAGE IN MODERATE TO STRENUOUS EXERCISE (LIKE A BRISK WALK)?: 2 DAYS

## 2025-03-12 ASSESSMENT — SOCIAL DETERMINANTS OF HEALTH (SDOH): HOW OFTEN DO YOU GET TOGETHER WITH FRIENDS OR RELATIVES?: ONCE A WEEK

## 2025-03-12 NOTE — Clinical Note
Please abstract the following data from this visit with this patient into the appropriate field in Epic:  Tests that can be patient reported without a hard copy:  Pap smear done on this date: 12/12/2024 (approximately), by this group: [facility] in [place] , results were normal , HPV negative as well. .

## 2025-03-12 NOTE — PATIENT INSTRUCTIONS
Patient Education   Based on our discussion, I have outlined the following instructions for you:    - Continue your IVF treatment. The next step is egg retrieval.  - After the egg retrieval, you will have surgery to remove fibroids and polyps from your uterus.  - Take cabergoline 0.5 mg twice a week to manage your elevated prolactin level.  - Schedule an MRI of your brain to check for any issues with your pituitary gland.  - Do exercises specifically for hamstring tendonitis to help with the pain in your left knee.  - Schedule a fasting lipid panel to check your cholesterol levels.    Thank you again for your visit, and we look forward to supporting you in your journey to better health.       We've entered future orders for fasting labs  and you can do this as a lab only appointment at our clinic.  Please call [phone] and press #2 to speak with your care team to schedule this or you can schedule it on [employer] at your convenience.  You can also have this done at any [facility] without appointment during regular outpatient lab hours.   [facility] outpatient lab hours 7am-7pm Monday-Friday, and 9am -noon Saturdays and Sundays .  They close promptly at 7pm weekdays and 12 noon on the weekends.          Please come in fasting :  nothing to eat for at least 8 , preferably 12 hours ahead of appointment. Please do come in well hydrated though - ok to  have water, black coffee or plain tea, BUT NO creamers or sweeteners of any kind, please.             Preventive Care Advice :   This is general advice given by our system to help you stay healthy. However, your care team may have specific advice just for you. Please talk to your care team about your preventive care needs.  Nutrition  Eat 5 or more servings of fruits and vegetables each day.  Try wheat bread, brown rice and whole grain pasta (instead of white bread, rice, and pasta).  Get enough calcium and vitamin D. Check the label on foods and aim for 100%  of the RDA (recommended daily allowance).  Lifestyle  Exercise at least 150 minutes each week  (30 minutes a day, 5 days a week).  Do muscle strengthening activities 2 days a week. These help control your weight and prevent disease.  No smoking.  Wear sunscreen to prevent skin cancer.  Have a dental exam and cleaning every 6 months.  Yearly exams  See your health care team every year to talk about:  Any changes in your health.  Any medicines your care team has prescribed.  Preventive care, family planning, and ways to prevent chronic diseases.  Shots (vaccines)   HPV shots (up to age 26), if you've never had them before.  Hepatitis B shots (up to age 59), if you've never had them before.  COVID-19 shot: Get this shot when it's due.  Flu shot: Get a flu shot every year.  Tetanus shot: Get a tetanus shot every 10 years.  Pneumococcal, hepatitis A, and RSV shots: Ask your care team if you need these based on your risk.  Shingles shot (for age 50 and up)  General health tests  Diabetes screening:  Starting at age 35, Get screened for diabetes at least every 3 years.  If you are younger than age 35, ask your care team if you should be screened for diabetes.  Cholesterol test: At age 39, start having a cholesterol test every 5 years, or more often if advised.  Bone density scan (DEXA): At age 50, ask your care team if you should have this scan for osteoporosis (brittle bones).  Hepatitis C: Get tested at least once in your life.  STIs (sexually transmitted infections)  Before age 24: Ask your care team if you should be screened for STIs.  After age 24: Get screened for STIs if you're at risk. You are at risk for STIs (including HIV) if:  You are sexually active with more than one person.  You don't use condoms every time.  You or a partner was diagnosed with a sexually transmitted infection.  If you are at risk for HIV, ask about PrEP medicine to prevent HIV.  Get tested for HIV at least once in your life, whether you are  at risk for HIV or not.  Cancer screening tests  Cervical cancer screening: If you have a cervix, begin getting regular cervical cancer screening tests starting at age 21.  Breast cancer scan (mammogram): If you've ever had breasts, begin having regular mammograms starting at age 40. This is a scan to check for breast cancer.  Colon cancer screening: It is important to start screening for colon cancer at age 45.  Have a colonoscopy test every 10 years (or more often if you're at risk) Or, ask your provider about stool tests like a FIT test every year or Cologuard test every 3 years.  To learn more about your testing options, visit:   .  For help making a decision, visit:   https://bit.ly/nu42250.  Prostate cancer screening test: If you have a prostate, ask your care team if a prostate cancer screening test (PSA) at age 55 is right for you.  Lung cancer screening: If you are a current or former smoker ages 50 to 80, ask your care team if ongoing lung cancer screenings are right for you.  For informational purposes only. Not to replace the advice of your health care provider. Copyright   2023 [facility]. All rights reserved. Clinically reviewed by the [facility]itions Program. LikeWhere [id number] - REV 01/24.  Eating Healthy Foods: Care Instructions  With every meal, you can make healthy food choices. Try to eat a variety of fruits, vegetables, whole grains, lean proteins, and low-fat dairy products. This can help you get the right balance of nutrients, including vitamins and minerals. Small changes add up over time. You can start by adding one healthy food to your meals each day.    Try to make half your plate fruits and vegetables, one-fourth whole grains, and one-fourth lean proteins. Try including dairy with your meals.   Eat more fruits and vegetables. Try to have them with most meals and snacks.   Foods for healthy eating        Fruits   These can be fresh, frozen, canned, or dried.  Try  "to choose whole fruit rather than fruit juice.  Eat a variety of colors.        Vegetables   These can be fresh, frozen, canned, or dried.  Beans, peas, and lentils count too.        Whole grains   Choose whole-grain breads, cereals, and noodles.  Try brown rice.        Lean proteins   These can include lean meat, poultry, fish, and eggs.  You can also have tofu, beans, peas, lentils, nuts, and seeds.        Dairy   Try milk, yogurt, and cheese.  Choose low-fat or fat-free when you can.  If you need to, use lactose-free milk or fortified plant-based milk products, such as soy milk.        Water   Drink water when you're thirsty.  Limit sugar-sweetened drinks, including soda, fruit drinks, and sports drinks.  Where can you learn more?  Go to https://www.[employer].net/patiented  Enter T756 in the search box to learn more about \"Eating Healthy Foods: Care Instructions.\"  Current as of: September 20, 2023  Content Version: 14.3    2024 [employer].   Care instructions adapted under license by your healthcare professional. If you have questions about a medical condition or this instruction, always ask your healthcare professional. [employer] disclaims any warranty or liability for your use of this information.    Learning About Stress  What is stress?     Stress is your body's response to a hard situation. Your body can have a physical, emotional, or mental response. Stress is a fact of life for most people, and it affects everyone differently. What causes stress for you may not be stressful for someone else.  A lot of things can cause stress. You may feel stress when you go on a job interview, take a test, or run a race. This kind of short-term stress is normal and even useful. It can help you if you need to work hard or react quickly. For example, stress can help you finish an important job on time.  Long-term stress is caused by ongoing stressful situations or events. Examples of long-term " stress include long-term health problems, ongoing problems at work, or conflicts in your family. Long-term stress can harm your health.  How does stress affect your health?  When you are stressed, your body responds as though you are in danger. It makes hormones that speed up your heart, make you breathe faster, and give you a burst of energy. This is called the fight-or-flight stress response. If the stress is over quickly, your body goes back to normal and no harm is done.  But if stress happens too often or lasts too long, it can have bad effects. Long-term stress can make you more likely to get sick, and it can make symptoms of some diseases worse. If you tense up when you are stressed, you may develop neck, shoulder, or low back pain. Stress is linked to high blood pressure and heart disease.  Stress also harms your emotional health. It can make you jo, tense, or depressed. Your relationships may suffer, and you may not do well at work or school.  What can you do to manage stress?  You can try these things to help manage stress:   Do something active. Exercise or activity can help reduce stress. Walking is a great way to get started. Even everyday activities such as housecleaning or yard work can help.  Try yoga or mario chi. These techniques combine exercise and meditation. You may need some training at first to learn them.  Do something you enjoy. For example, listen to music or go to a movie. Practice your hobby or do volunteer work.  Meditate. This can help you relax, because you are not worrying about what happened before or what may happen in the future.  Do guided imagery. Imagine yourself in any setting that helps you feel calm. You can use online videos, books, or a teacher to guide you.  Do breathing exercises. For example:  From a standing position, bend forward from the waist with your knees slightly bent. Let your arms dangle close to the floor.  Breathe in slowly and deeply as you return to a  "standing position. Roll up slowly and lift your head last.  Hold your breath for just a few seconds in the standing position.  Breathe out slowly and bend forward from the waist.  Let your feelings out. Talk, laugh, cry, and express anger when you need to. Talking with supportive friends or family, a counselor, or a david leader about your feelings is a healthy way to relieve stress. Avoid discussing your feelings with people who make you feel worse.  Write. It may help to write about things that are bothering you. This helps you find out how much stress you feel and what is causing it. When you know this, you can find better ways to cope.  What can you do to prevent stress?  You might try some of these things to help prevent stress:  Manage your time. This helps you find time to do the things you want and need to do.  Get enough sleep. Your body recovers from the stresses of the day while you are sleeping.  Get support. Your family, friends, and community can make a difference in how you experience stress.  Limit your news feed. Avoid or limit time on social media or news that may make you feel stressed.  Do something active. Exercise or activity can help reduce stress. Walking is a great way to get started.  Where can you learn more?  Go to https://www.[employer].net/patiented  Enter N032 in the search box to learn more about \"Learning About Stress.\"  Current as of: October 24, 2023  Content Version: 14.3    2024 [employer].   Care instructions adapted under license by your healthcare professional. If you have questions about a medical condition or this instruction, always ask your healthcare professional. [employer] disclaims any warranty or liability for your use of this information.    Thank you so much or choosing [facility]  for your Health Care. It was a pleasure seeing you at your visit today! Please contact us with any questions or concerns you may have.  " "                 Luz Elise MD                              To reach your [facility] care team after hours call:   [phone] press #2 \"to speak with your care team\".  This will get you to our clinic instead of routing to central [facility]  scheduling.     PLEASE NOTE OUR HOURS HAVE CHANGED secondary to COVID-19 coronavirus pandemic, as we are trying to minimize patient exposure to the virus,  which is now widespread in the state.  These hours may change with very little notice.  We apologize for any inconvenience.       Our current clinic hours are:          Monday- Thursday   7:00am - 6:00pm  in person.      Friday  7:00am- 5:00pm                       Saturday and Sunday : Closed to in person and virtual visits        We have telephone and virtual visit times available between    7:00am - 6pm on Monday-Friday as well.                                                Phone:  [phone]      Our pharmacy hours: Monday through Friday 8:00am to 5:00pm                        Saturday - 9:00 am to 12 noon       Sunday : Closed.              Phone:  [phone]              ###  Please note: at this time we are not accepting any walk-in visits. ###      There is also information available at our web site:  [email or web address]    If your provider ordered any lab tests and you do not receive the results within 10 business days, please call the clinic.    If you need a medication refill please contact your pharmacy.  Please allow 3 business days for your refill to be completed.    Our clinic offers telephone visits and e visits.  Please ask one of your team members to explain more.      Use Notehall[employer] (secure email communication and access to your chart) to send your primary care provider a message or make an appointment. Ask someone on your Team how to sign up for [employer].                  "

## 2025-03-12 NOTE — PROGRESS NOTES
Preventive Care Visit  [facility] [place]  Luz Elise MD, Family Medicine  Mar 12, 2025      Assessment & Plan :       ICD-10-CM    1. Routine general medical examination at a health care facility  Z00.00       2. History of in vitro fertilization  Z98.890       3. Uterine leiomyoma, unspecified location- now one  D25.9       4. Uterine polyp- 1-2 on saline enhanced US  N84.0       5. Elevated prolactin level- cabergoline twice weekly per CRM  R79.89 cabergoline (DOSTINEX) 0.5 MG tablet     Human growth hormone     Prolactin     MR Brain w/o & w Contrast     Follicle stimulating hormone     Luteinizing Hormone      6. Acute pain of left knee - tightness behind left knee - comes and goes- hamstring tendonitis  M25.562       7. Familial hypercholesterolemia  E78.01 Lipid panel reflex to direct LDL Fasting      8. Hyperlipidemia LDL goal <160- needs labs checked fasting  E78.5 Lipid panel reflex to direct LDL Fasting      9. Hamstring tendonitis of left thigh- left distal thigh behind left knee  M76.892         Assessment & Plan  - Infertility and IVF Treatment: per ob/gyn/infertility specialists: plan is to Continue IVF with egg retrieval first, then surgery for fibroid and polyp removal before embryo transfer.  - Uterine Leiomyoma and Polyp: Fibroid and polyp affecting the uterine cavity need removal. Plan surgery after egg retrieval.  - Elevated Prolactin Level: Managed with cabergoline 0.5 mg twice a week. Consider potential for pituitary microadenoma. Ordered MRI of the brain.  - Knee Discomfort: Likely hamstring tendonitis. Provide exercises for hamstring tendonitis.  - Hyperlipidemia: Schedule fasting lipid panel.  - Hamstring Tendonitis: Confirmed in the left thigh. Provide exercises for hamstring tendonitis.    Patient has been advised of split billing requirements and indicates understanding: Yes    Return in about 1 year (around 3/12/2026) for Wellness/Preventative Visit, w/   "V for 40 min appt.     Counseling  Appropriate preventive services were addressed with this patient via screening, questionnaire, or discussion as appropriate for fall prevention, nutrition, physical activity, Tobacco-use cessation, social engagement, weight loss and cognition.  Checklist reviewing preventive services available has been given to the patient.  Reviewed patient's diet, addressing concerns and/or questions.   She is at risk for lack of exercise and has been provided with information to increase physical activity for the benefit of her well-being.   She is at risk for psychosocial distress and has been provided with information to reduce risk.       MEDICATIONS:   Orders Placed This Encounter   Medications    cabergoline (DOSTINEX) 0.5 MG tablet     Sig: Take 0.5 tablets (0.25 mg) by mouth twice a week.     Rx'd by DELTA Sutton          - Continue other medications without change  Regular exercise  See Patient Instructions  The longitudinal plan of care for the diagnosis(es)/condition(s) as documented were addressed during this visit. Due to the added complexity in care, I will continue to support Rica in the subsequent management and with ongoing continuity of care.    \"Consent was obtained from the patient to use an AI scribe/documentation tool in the creation of this note.This note/dictation was performed and completed with the assistance of voice recognition software and an AI scribe. It may contain inadvertant transcription  errors,  omissions and/or  inadvertent word substitution.\" --Luz Elise MD         Subjective   Rica is a 39 year old, presenting for the following:  Physical  and the following other medical problems:      1. Routine general medical examination at a health care facility    2. History of in vitro fertilization    3. Uterine leiomyoma, unspecified location- now one    4. Uterine polyp- 1-2 on saline enhanced US    5. Elevated prolactin level            " 3/12/2025     2:51 PM   Additional Questions   Roomed by Angelica RIVERA   Accompanied by Self          HPI   History of Present Illness-  Rica Holguin, 39 years    Infertility and IVF Treatment  Rica did IVF last year in [place], getting two viable embryos, but both transfers failed. She's restarting IVF in [place] and dealing with insurance issues because of her 's coverage. A recent saline sonogram found fibroids and polyps in her uterus, which weren't there last year. A fibroid is affecting the uterine cavity. She plans to do egg retrieval before removing the fibroid and polyp. She has high prolactin levels and takes cabergoline 0.5 mg twice a week. Her prolactin levels went from 27.75 to 59 before medication, then down to 12.6. the cabergoline was rx'd by her [facility] and no MRI brain or other workup was done to rule out a pituitary mass or microadenoma. Discussed with pt and she'd like to do further testing and MRI brain.  Ordered. Cabergoline added to med list.      Knee Discomfort  Rica feels discomfort behind her left knee for 2-3 weeks, like after sitting with legs crossed too long. It's intermittent, with no swelling or constant pain. She has a desk job and sits a lot, which might contribute. She's trying to stretch her leg more.    Past Medical History  Rica had a LEEP procedure in 2013 by Dr. Mees at [facility]. All pap smears have been normal since. She had a recent pap smear on December 12, 2024, which was clean. Tests related to IVF, including asthma and urea plasma screenings, were negative.    Family History  Rica has no family history of early breast cancer, colon cancer, or ovarian cancer. Her mother has high cholesterol, which runs in the family.    Social History  Rica is  to Ulices, who had testicular cancer treated with radiation and a testicle removal. He had a vasectomy in his 30s, reversed in his early 40s, but no sperm is present  post-reversal. Rica experiences high stress and anxiety.        Advance Care Planning  Patient does not have a Health Care Directive: Discussed advance care planning with patient; however, patient declined at this time.      3/12/2025   General Health   How would you rate your overall physical health? Good   Feel stress (tense, anxious, or unable to sleep) Rather much   (!) STRESS CONCERN      3/12/2025   Nutrition   Three or more servings of calcium each day? (!) NO   Diet: Regular (no restrictions)   How many servings of fruit and vegetables per day? (!) 0-1   How many sweetened beverages each day? 0-1         3/12/2025   Exercise   Days per week of moderate/strenous exercise 2 days   Average minutes spent exercising at this level 30 min   (!) EXERCISE CONCERN      3/12/2025   Social Factors   Frequency of gathering with friends or relatives Once a week   Worry food won't last until get money to buy more No   Food not last or not have enough money for food? No   Do you have housing? (Housing is defined as stable permanent housing and does not include staying ouside in a car, in a tent, in an abandoned building, in an overnight shelter, or couch-surfing.) Yes   Are you worried about losing your housing? No   Lack of transportation? No   Unable to get utilities (heat,electricity)? No         3/12/2025   Dental   Dentist two times every year? Yes           2/27/2024   TB Screening   Were you born outside of the [place]? (!) YES  - in [place] - see social hx              Today's PHQ-2 Score:       3/5/2025     8:05 AM   PHQ-2 ( 1999 [employer])   Q1: Little interest or pleasure in doing things 0   Q2: Feeling down, depressed or hopeless 0   PHQ-2 Score 0    Q1: Little interest or pleasure in doing things Not at all   Q2: Feeling down, depressed or hopeless Not at all   PHQ-2 Score 0       Patient-reported         3/12/2025   Substance Use   Alcohol more than 3/day or more than 7/wk No   Do you use any other substances  recreationally? No     Social History     Tobacco Use    Smoking status: Former     Current packs/day: 0.00     Average packs/day: 0.5 packs/day for 9.0 years (4.5 ttl pk-yrs)     Types: Cigarettes     Start date: 2005     Quit date: 2014     Years since quittin.2    Smokeless tobacco: Never   Vaping Use    Vaping status: Never Used   Substance Use Topics    Alcohol use: Never    Drug use: Never          Mammogram Screening - Patient under 40 years of age: Routine Mammogram Screening not recommended.         3/12/2025   STI Screening   New sexual partner(s) since last STI/HIV test? No     History of abnormal Pap smear: YES - reflected in Problem List and Health Maintenance accordingly             3/12/2025   Contraception/Family Planning   Questions about contraception or family planning No     Reviewed and updated as needed this visit by Provider                    Past Medical History:   Diagnosis Date    Encounter for gynecological examination without abnormal finding     sees Myrna Vazquez, at [facility] in [place], [place]    Herniation of lumbar intervertebral disc     chronic LBP - herniation L4-L5 - no radicular symptoms- seeing Dr. Hill- [facility]    History of in vitro fertilization     2 viable embryos and transfers that were not successful - done in [place], now doing round 2 with [facility] in [facility] - insurance issues    Hyperlipidemia LDL goal <160     doesn't run in family    Low grade squamous intraepithelial lesion on cytologic smear of cervix (LGSIL)     s/p LEEP in  - at [facility] in [place] - normal paps since then    Tension-type headache, not intractable, unspecified chronicity pattern     feels knots in back of neck     Past Surgical History:   Procedure Laterality Date    LEEP TX, CERVICAL      at [facility] in [place] - normal paps since then    XR FOOT SURGERY QUIRINO LEFT      reattached dorsal tendon after fall into glass table while hanging a picture     OB History     Para Term  AB Living   1 0 0 0 1 0   SAB IAB Ectopic Multiple Live Births   0 1 0 0 0      # Outcome Date GA Lbr Moises/2nd Weight Sex Type Anes PTL Lv   1 IAB               Obstetric Comments   Had a TAB around 6wks EGA - at age 26      Lab work is in process  Labs reviewed in EPIC  BP Readings from Last 3 Encounters:   25 115/80   25 124/80   24 122/78    Wt Readings from Last 3 Encounters:   25 57.7 kg (127 lb 4.8 oz)   25 57.2 kg (126 lb)   24 61.2 kg (135 lb)                  Patient Active Problem List   Diagnosis    Encounter for gynecological examination without abnormal finding - saw  [facility] in [place] now for ob/gyn & uterine fibroids and uterine polyp- last pap & exam 2024    Herniation of lumbar intervertebral disc-L4-L5 - no radicular symptoms- seeing Dr. Elizabeth Hill- [facility]    Hyperlipidemia LDL goal <160    Tension-type headache, not intractable, unspecified chronicity pattern    Low grade squamous intraepithelial lesion on cytologic smear of cervix (LGSIL)- in  with Dr. Vazquez at [facility]  - normal paps since then    Screening for HIV (human immunodeficiency virus)- negative at [facility] 2024    Need for hepatitis C screening test- negative at [facility] 2024    Cervical cancer screening last pap with neg HPV - at [facility] in [place] - Dr. Alisia Briggs -2024    Lump in neck- left posterolateral 1.5 x 0.8 x 0.3cm stable on US since  - pt desires repeat US    Hepatitis B virus serologic status unknown - tested negative 3/1/2024 even after 3 immunizations in [id number]    History of in vitro fertilization     Past Surgical History:   Procedure Laterality Date    LEEP TX, CERVICAL  2014    at [facility] in [place] - normal paps since then    XR FOOT SURGERY QUIRINO LEFT      reattached dorsal tendon after fall into glass table while hanging a picture       Social History     Tobacco Use    Smoking status: Former     Current packs/day: 0.00      Average packs/day: 0.5 packs/day for 9.0 years (4.5 ttl pk-yrs)     Types: Cigarettes     Start date: 2005     Quit date: 2014     Years since quittin.2    Smokeless tobacco: Never   Substance Use Topics    Alcohol use: Never     Family History   Problem Relation Age of Onset    No Known Problems Mother     No Known Problems Father          Current Outpatient Medications   Medication Sig Dispense Refill    cabergoline (DOSTINEX) 0.5 MG tablet Take 0.5 tablets (0.25 mg) by mouth twice a week.      Coenzyme Q-10 60 MG CAPS       fish oil-omega-3 fatty acids 500 MG capsule       Prenatal Vit-Fe Fumarate-FA (PRENATAL VITAMIN PLUS LOW IRON) 27-1 MG TABS Take 1 tablet by mouth daily      vitamin B-Complex Take 1 tablet by mouth daily      vitamin C (ASCORBIC ACID) 100 MG tablet Take 100 mg by mouth 3 times daily      Vitamin D, Cholecalciferol, 10 MCG (400 UNIT) TABS        No Known Allergies  Recent Labs   Lab Test 24  1615 22  1349 03/15/21  1058   *  --   --    HDL 53  --   --    TRIG 98 75  --    TSH  --   --  1.80      Results  - Saline sonogram (recent): Found fibroids and polyps in the uterus, with one or two polyps and a fibroid affecting the uterine cavity.  - Blood tests (2025):  - White blood cell count: 7  - Hemoglobin: 14.1  - Platelets: 421  - Prolactin levels:  - End of 2024: 27.75 (considered high)  - A week later: 59 (increased)  - After medication: 12.6  - Comprehensive metabolic panel (2025):  - Glucose: 93  - BUN: 15  - Creatinine: 0.63  - eGFR: 116  - Alk Phos: 58  - AST: 13  - ALT: 15  - Potassium: 4.2  - Calcium: 9.9  - Vitamin D (25-hydroxyvitamin D): 64.5  - Hemoglobin A1c (2024): 4.9  - Pap smear (2024): Negative  - HPV test: Negative  - Asthma and Ureaplasma tests: Negative        Review of Systems  Constitutional, HEENT, cardiovascular, pulmonary, GI, , musculoskeletal, neuro, skin, endocrine and  "psych systems are negative, except as otherwise noted.     Objective    Exam  /80   Pulse 109   Temp 97.9  F (36.6  C) (Tympanic)   Resp 14   Ht 1.651 m (5' 5\")   Wt 57.7 kg (127 lb 4.8 oz)   LMP 02/13/2025 (Exact Date)   SpO2 98%   BMI 21.18 kg/m     Estimated body mass index is 21.18 kg/m  as calculated from the following:    Height as of this encounter: 1.651 m (5' 5\").    Weight as of this encounter: 57.7 kg (127 lb 4.8 oz).    Physical Exam  GENERAL: alert and no distress  EYES: Eyes grossly normal to inspection, PERRL and conjunctivae and sclerae normal  HENT: ear canals and TM's normal, nose and mouth without ulcers or lesions  NECK: no adenopathy, no asymmetry, masses, or scars  RESP: lungs clear to auscultation - no rales, rhonchi or wheezes  CV: regular rate and rhythm, normal S1 S2, no S3 or S4, no murmur, click or rub, no peripheral edema  ABDOMEN: soft, nontender, no hepatosplenomegaly, no masses and bowel sounds normal  MS: no gross musculoskeletal defects noted, no edema  SKIN: no suspicious lesions or rashes  NEURO: Normal strength and tone, mentation intact and speech normal  PSYCH: mentation appears normal, affect normal/bright,  Alert and oriented. No acute distress. Appears well-groomed and casually dressed. Affect is normal, not particularly depressed. In good humor and laughs appropriately. Not particularly anxious. No evidence of psychosis.     Breast and pelvic/pap exams done at [facility] in [place] on 12/12/2024     Signed Electronically by: Luz Elise MD  "

## 2025-03-27 ENCOUNTER — TRANSFERRED RECORDS (OUTPATIENT)
Dept: HEALTH INFORMATION MANAGEMENT | Facility: CLINIC | Age: 40
End: 2025-03-27

## 2025-03-28 ENCOUNTER — HOSPITAL ENCOUNTER (OUTPATIENT)
Dept: ULTRASOUND IMAGING | Facility: CLINIC | Age: 40
Discharge: HOME | End: 2025-03-28
Attending: FAMILY MEDICINE
Payer: COMMERCIAL

## 2025-03-28 DIAGNOSIS — R22.1 LUMP IN NECK: ICD-10-CM

## 2025-03-28 DIAGNOSIS — L72.9 FOLLICULAR CYST OF SKIN AND SUBCUTANEOUS TISSUE: ICD-10-CM

## 2025-03-28 PROCEDURE — 76536 US EXAM OF HEAD AND NECK: CPT

## 2025-03-28 PROCEDURE — 76604 US EXAM CHEST: CPT | Mod: 52

## 2025-04-02 ENCOUNTER — HOSPITAL ENCOUNTER (OUTPATIENT)
Dept: MRI IMAGING | Facility: CLINIC | Age: 40
Discharge: HOME | End: 2025-04-02
Attending: FAMILY MEDICINE
Payer: COMMERCIAL

## 2025-04-02 DIAGNOSIS — R79.89 ELEVATED PROLACTIN LEVEL: ICD-10-CM

## 2025-04-02 PROCEDURE — 255N000002 HC RX 255 OP 636: Performed by: FAMILY MEDICINE

## 2025-04-02 PROCEDURE — A9585 GADOBUTROL INJECTION: HCPCS | Performed by: FAMILY MEDICINE

## 2025-04-02 PROCEDURE — 70553 MRI BRAIN STEM W/O & W/DYE: CPT

## 2025-04-02 RX ORDER — GADOBUTROL 604.72 MG/ML
6 INJECTION INTRAVENOUS ONCE
Status: COMPLETED | OUTPATIENT
Start: 2025-04-02 | End: 2025-04-02

## 2025-04-02 RX ADMIN — GADOBUTROL 6 ML: 604.72 INJECTION INTRAVENOUS at 14:30

## 2025-04-07 ENCOUNTER — TELEPHONE (OUTPATIENT)
Dept: FAMILY MEDICINE | Facility: CLINIC | Age: 40
End: 2025-04-07
Payer: COMMERCIAL

## 2025-04-07 NOTE — TELEPHONE ENCOUNTER
Patient is looking for MRI result from 4/2     Patient states is freaking out and can't see the results on mychart.     Read MRI impression to patient.    Explained provider will review and write  a summary and any recommendation that will post to mychart in a result note.     Patient states understanding and denied ?'s

## 2025-07-14 PROCEDURE — 88342 IMHCHEM/IMCYTCHM 1ST ANTB: CPT | Mod: TC,ORL | Performed by: OBSTETRICS & GYNECOLOGY

## 2025-07-14 PROCEDURE — 88342 IMHCHEM/IMCYTCHM 1ST ANTB: CPT | Mod: 26 | Performed by: PATHOLOGY

## 2025-07-14 PROCEDURE — 88305 TISSUE EXAM BY PATHOLOGIST: CPT | Mod: TC,ORL | Performed by: OBSTETRICS & GYNECOLOGY

## 2025-07-14 PROCEDURE — 88305 TISSUE EXAM BY PATHOLOGIST: CPT | Mod: 26 | Performed by: PATHOLOGY

## 2025-07-15 ENCOUNTER — LAB REQUISITION (OUTPATIENT)
Dept: LAB | Facility: CLINIC | Age: 40
End: 2025-07-15
Payer: COMMERCIAL

## 2025-08-01 LAB
PATH REPORT.COMMENTS IMP SPEC: NORMAL
PATH REPORT.COMMENTS IMP SPEC: NORMAL
PATH REPORT.FINAL DX SPEC: NORMAL
PATH REPORT.GROSS SPEC: NORMAL
PATH REPORT.MICROSCOPIC SPEC OTHER STN: NORMAL
PATH REPORT.RELEVANT HX SPEC: NORMAL
PHOTO IMAGE: NORMAL